# Patient Record
Sex: MALE | Race: WHITE | Employment: FULL TIME | URBAN - METROPOLITAN AREA
[De-identification: names, ages, dates, MRNs, and addresses within clinical notes are randomized per-mention and may not be internally consistent; named-entity substitution may affect disease eponyms.]

---

## 2021-09-28 ENCOUNTER — OFFICE VISIT (OUTPATIENT)
Dept: FAMILY MEDICINE CLINIC | Age: 36
End: 2021-09-28
Payer: COMMERCIAL

## 2021-09-28 VITALS
SYSTOLIC BLOOD PRESSURE: 128 MMHG | HEIGHT: 74 IN | OXYGEN SATURATION: 98 % | RESPIRATION RATE: 15 BRPM | DIASTOLIC BLOOD PRESSURE: 84 MMHG | HEART RATE: 85 BPM | TEMPERATURE: 98.2 F | BODY MASS INDEX: 30.03 KG/M2 | WEIGHT: 234 LBS

## 2021-09-28 DIAGNOSIS — N52.9 ERECTILE DYSFUNCTION, UNSPECIFIED ERECTILE DYSFUNCTION TYPE: Primary | ICD-10-CM

## 2021-09-28 DIAGNOSIS — R73.9 HYPERGLYCEMIA: ICD-10-CM

## 2021-09-28 PROCEDURE — 99203 OFFICE O/P NEW LOW 30 MIN: CPT | Performed by: INTERNAL MEDICINE

## 2021-09-28 RX ORDER — TADALAFIL 10 MG/1
10 TABLET ORAL DAILY PRN
Qty: 30 TABLET | Refills: 2 | Status: SHIPPED | OUTPATIENT
Start: 2021-09-28 | End: 2021-11-08 | Stop reason: SDUPTHER

## 2021-09-28 SDOH — ECONOMIC STABILITY: FOOD INSECURITY: WITHIN THE PAST 12 MONTHS, THE FOOD YOU BOUGHT JUST DIDN'T LAST AND YOU DIDN'T HAVE MONEY TO GET MORE.: NEVER TRUE

## 2021-09-28 SDOH — ECONOMIC STABILITY: FOOD INSECURITY: WITHIN THE PAST 12 MONTHS, YOU WORRIED THAT YOUR FOOD WOULD RUN OUT BEFORE YOU GOT MONEY TO BUY MORE.: NEVER TRUE

## 2021-09-28 ASSESSMENT — PATIENT HEALTH QUESTIONNAIRE - PHQ9
SUM OF ALL RESPONSES TO PHQ QUESTIONS 1-9: 0
SUM OF ALL RESPONSES TO PHQ9 QUESTIONS 1 & 2: 0
1. LITTLE INTEREST OR PLEASURE IN DOING THINGS: 0
SUM OF ALL RESPONSES TO PHQ QUESTIONS 1-9: 0
2. FEELING DOWN, DEPRESSED OR HOPELESS: 0
SUM OF ALL RESPONSES TO PHQ QUESTIONS 1-9: 0

## 2021-09-28 ASSESSMENT — ENCOUNTER SYMPTOMS
ABDOMINAL PAIN: 0
BACK PAIN: 0
EYE PAIN: 0
SHORTNESS OF BREATH: 0

## 2021-09-28 ASSESSMENT — SOCIAL DETERMINANTS OF HEALTH (SDOH): HOW HARD IS IT FOR YOU TO PAY FOR THE VERY BASICS LIKE FOOD, HOUSING, MEDICAL CARE, AND HEATING?: NOT HARD AT ALL

## 2021-09-28 NOTE — PROGRESS NOTES
Subjective:      Patient ID: Khanh Mccracken is a 29 y.o. male who presents today with:  Chief Complaint   Patient presents with    Establish Care     no recent PCP     Flu Vaccine     declined        HPI    History reviewed. No pertinent past medical history. History reviewed. No pertinent surgical history. Social History     Socioeconomic History    Marital status: Unknown     Spouse name: Not on file    Number of children: Not on file    Years of education: Not on file    Highest education level: Not on file   Occupational History    Not on file   Tobacco Use    Smoking status: Former Smoker     Packs/day: 0.25     Years: 5.00     Pack years: 1.25     Types: Cigarettes     Quit date: 9/28/2011     Years since quitting: 10.0    Smokeless tobacco: Former User     Quit date: 2011   Vaping Use    Vaping Use: Never assessed   Substance and Sexual Activity    Alcohol use: Yes     Comment: social     Drug use: Never    Sexual activity: Not on file   Other Topics Concern    Not on file   Social History Narrative    Not on file     Social Determinants of Health     Financial Resource Strain: Low Risk     Difficulty of Paying Living Expenses: Not hard at all   Food Insecurity: No Food Insecurity    Worried About [address] in the Last Year: Never true    [facility] in the Last Year: Never true   Transportation Needs:     Lack of Transportation (Medical):      Lack of Transportation (Non-Medical):    Physical Activity:     Days of Exercise per Week:     Minutes of Exercise per Session:    Stress:     Feeling of Stress :    Social Connections:     Frequency of Communication with Friends and Family:     Frequency of Social Gatherings with Friends and Family:     Attends Church Services:     Active Member of Clubs or Organizations:     Attends Club or Organization Meetings:     Marital Status:    Intimate Partner Violence:     Fear of Current or Ex-Partner:     Emotionally Abused:  Physically Abused:     Sexually Abused:      No Known Allergies  No current outpatient medications on file prior to visit. No current facility-administered medications on file prior to visit. I have personally reviewed the ROS, PMH, PFH, and social history     Review of Systems   Constitutional: Negative for chills. HENT: Negative for congestion. Eyes: Negative for pain. Respiratory: Negative for shortness of breath. Cardiovascular: Negative for chest pain. Gastrointestinal: Negative for abdominal pain. Genitourinary: Negative for hematuria. Musculoskeletal: Negative for back pain. Allergic/Immunologic: Negative for immunocompromised state. Neurological: Negative for headaches. Psychiatric/Behavioral: Negative for hallucinations. Objective:   /84 (Site: Right Upper Arm, Position: Sitting, Cuff Size: Large Adult)   Pulse 85   Temp 98.2 °F (36.8 °C) (Tympanic)   Resp 15   Ht 6' 2\" (1.88 m)   Wt 234 lb (106.1 kg)   SpO2 98%   BMI 30.04 kg/m²     Physical Exam  Constitutional:       General: He is not in acute distress. Appearance: Normal appearance. He is not ill-appearing, toxic-appearing or diaphoretic. HENT:      Head: Normocephalic. Neck:      Vascular: No carotid bruit. Cardiovascular:      Rate and Rhythm: Normal rate and regular rhythm. Pulses: Normal pulses. Heart sounds: Normal heart sounds. No murmur heard. No friction rub. No gallop. Pulmonary:      Effort: Pulmonary effort is normal. No respiratory distress. Breath sounds: Normal breath sounds. No wheezing, rhonchi or rales. Abdominal:      General: Abdomen is flat. There is no distension. Palpations: Abdomen is soft. Tenderness: There is no abdominal tenderness. There is no right CVA tenderness, left CVA tenderness, guarding or rebound. Musculoskeletal:      Cervical back: Neck supple. Right lower leg: No edema. Left lower leg: No edema.    Skin: General: Skin is warm. Findings: No erythema or rash. Neurological:      Mental Status: He is alert. Psychiatric:         Mood and Affect: Mood normal.           Assessment:       Diagnosis Orders   1. Erectile dysfunction, unspecified erectile dysfunction type  TSH with Reflex    Vitamin D 25 Hydroxy    CBC Auto Differential    Comprehensive Metabolic Panel    Lipid Panel    Testosterone Free And Total Male         Plan:     vc  Start cialis. See orders. Orders Placed This Encounter   Procedures    TSH with Reflex     Standing Status:   Future     Standing Expiration Date:   9/28/2022    Vitamin D 25 Hydroxy     Standing Status:   Future     Standing Expiration Date:   9/28/2022    CBC Auto Differential     Standing Status:   Future     Standing Expiration Date:   9/28/2022    Comprehensive Metabolic Panel     Standing Status:   Future     Standing Expiration Date:   9/28/2022    Lipid Panel     Standing Status:   Future     Standing Expiration Date:   9/28/2022     Order Specific Question:   Is Patient Fasting?/# of Hours     Answer:   10    Testosterone Free And Total Male     Standing Status:   Future     Standing Expiration Date:   9/28/2022     Orders Placed This Encounter   Medications    tadalafil (CIALIS) 10 MG tablet     Sig: Take 1 tablet by mouth daily as needed for Erectile Dysfunction Max 1 tablet per 24 hour period. Dispense:  30 tablet     Refill:  2   flu shot 2021 he will get at work. Given rx for pde inhibitor, knows not to take any new medications without me knowing as it can cause an unsafe drop in blood pressure. Any visual complaints call me as well. If anything should change or worsen call ASAP, don't wait for next scheduled appointment. No follow-ups on file.       Vickie Damon MD

## 2021-09-29 ENCOUNTER — TELEPHONE (OUTPATIENT)
Dept: FAMILY MEDICINE CLINIC | Age: 36
End: 2021-09-29

## 2021-11-08 ENCOUNTER — OFFICE VISIT (OUTPATIENT)
Dept: FAMILY MEDICINE CLINIC | Age: 36
End: 2021-11-08
Payer: COMMERCIAL

## 2021-11-08 VITALS
HEIGHT: 73 IN | WEIGHT: 237 LBS | TEMPERATURE: 98.2 F | SYSTOLIC BLOOD PRESSURE: 124 MMHG | HEART RATE: 100 BPM | DIASTOLIC BLOOD PRESSURE: 86 MMHG | OXYGEN SATURATION: 98 % | BODY MASS INDEX: 31.41 KG/M2

## 2021-11-08 DIAGNOSIS — R73.9 HYPERGLYCEMIA: ICD-10-CM

## 2021-11-08 DIAGNOSIS — N52.9 ERECTILE DYSFUNCTION, UNSPECIFIED ERECTILE DYSFUNCTION TYPE: ICD-10-CM

## 2021-11-08 DIAGNOSIS — R73.9 HYPERGLYCEMIA: Primary | ICD-10-CM

## 2021-11-08 DIAGNOSIS — E78.1 HYPERTRIGLYCERIDEMIA: ICD-10-CM

## 2021-11-08 LAB
ALBUMIN SERPL-MCNC: 4.8 G/DL (ref 3.5–4.6)
ALP BLD-CCNC: 102 U/L (ref 35–104)
ALT SERPL-CCNC: 36 U/L (ref 0–41)
ANION GAP SERPL CALCULATED.3IONS-SCNC: 17 MEQ/L (ref 9–15)
AST SERPL-CCNC: 26 U/L (ref 0–40)
BILIRUB SERPL-MCNC: 0.3 MG/DL (ref 0.2–0.7)
BUN BLDV-MCNC: 12 MG/DL (ref 6–20)
CALCIUM SERPL-MCNC: 10 MG/DL (ref 8.5–9.9)
CHLORIDE BLD-SCNC: 100 MEQ/L (ref 95–107)
CO2: 21 MEQ/L (ref 20–31)
CREAT SERPL-MCNC: 0.68 MG/DL (ref 0.7–1.2)
GFR AFRICAN AMERICAN: >60
GFR NON-AFRICAN AMERICAN: >60
GLOBULIN: 3.1 G/DL (ref 2.3–3.5)
GLUCOSE BLD-MCNC: 274 MG/DL (ref 70–99)
HBA1C MFR BLD: 10.6 % (ref 4.8–5.9)
LDL CHOLESTEROL DIRECT: 113 MG/DL (ref 0–129)
POTASSIUM SERPL-SCNC: 4.4 MEQ/L (ref 3.4–4.9)
SODIUM BLD-SCNC: 138 MEQ/L (ref 135–144)
TOTAL PROTEIN: 7.9 G/DL (ref 6.3–8)

## 2021-11-08 PROCEDURE — 99214 OFFICE O/P EST MOD 30 MIN: CPT | Performed by: INTERNAL MEDICINE

## 2021-11-08 RX ORDER — FENOFIBRATE 48 MG/1
48 TABLET, COATED ORAL DAILY
Qty: 30 TABLET | Refills: 0 | Status: SHIPPED | OUTPATIENT
Start: 2021-11-08 | End: 2021-12-07

## 2021-11-08 RX ORDER — TADALAFIL 10 MG/1
10 TABLET ORAL DAILY PRN
Qty: 30 TABLET | Refills: 5 | Status: SHIPPED | OUTPATIENT
Start: 2021-11-08 | End: 2022-01-19 | Stop reason: SDUPTHER

## 2021-11-08 RX ORDER — METFORMIN HYDROCHLORIDE 500 MG/1
500 TABLET, EXTENDED RELEASE ORAL EVERY 12 HOURS
Qty: 28 TABLET | Refills: 0 | Status: SHIPPED | OUTPATIENT
Start: 2021-11-08 | End: 2021-12-07

## 2021-11-08 RX ORDER — CLOTRIMAZOLE 1 %
CREAM (GRAM) TOPICAL
Qty: 30 G | Refills: 2 | Status: SHIPPED | OUTPATIENT
Start: 2021-11-08 | End: 2021-11-15

## 2021-11-08 ASSESSMENT — ENCOUNTER SYMPTOMS
EYE PAIN: 0
SHORTNESS OF BREATH: 0
BACK PAIN: 0
ABDOMINAL PAIN: 0

## 2021-11-08 NOTE — PROGRESS NOTES
file    Attends Jewish Services: Not on file    Active Member of Clubs or Organizations: Not on file    Attends Club or Organization Meetings: Not on file    Marital Status: Not on file   Intimate Partner Violence:     Fear of Current or Ex-Partner: Not on file    Emotionally Abused: Not on file    Physically Abused: Not on file    Sexually Abused: Not on file   Housing Stability:     Unable to Pay for Housing in the Last Year: Not on file    Number of [place] in the Last Year: Not on file    Unstable Housing in the Last Year: Not on file     Current Outpatient Medications on File Prior to Visit   Medication Sig Dispense Refill    vitamin D 25 MCG (1000 UT) CAPS Take by mouth daily       No current facility-administered medications on file prior to visit. No Known Allergies    Review of Systems   Constitutional: Negative for chills. HENT: Negative for congestion. Eyes: Negative for pain. Respiratory: Negative for shortness of breath. Cardiovascular: Negative for chest pain. Gastrointestinal: Negative for abdominal pain. Genitourinary: Negative for hematuria. Musculoskeletal: Negative for back pain. Allergic/Immunologic: Negative for immunocompromised state. Neurological: Negative for headaches. Psychiatric/Behavioral: Negative for hallucinations. Objective  Vitals:    11/08/21 1048   BP: 124/86   Pulse: 100   Temp: 98.2 °F (36.8 °C)   SpO2: 98%   Weight: 237 lb (107.5 kg)   Height: 6' 1\" (1.854 m)     Physical Exam  Constitutional:       General: He is not in acute distress. Appearance: Normal appearance. He is not ill-appearing, toxic-appearing or diaphoretic. HENT:      Head: Normocephalic. Neck:      Vascular: No carotid bruit. Cardiovascular:      Rate and Rhythm: Normal rate and regular rhythm. Pulses: Normal pulses. Heart sounds: Normal heart sounds. No murmur heard. No friction rub. No gallop.     Pulmonary:      Effort: Pulmonary effort Standing Expiration Date:   11/8/2022     Orders Placed This Encounter   Medications    fenofibrate (TRICOR) 48 MG tablet     Sig: Take 1 tablet by mouth daily     Dispense:  30 tablet     Refill:  0    tadalafil (CIALIS) 10 MG tablet     Sig: Take 1 tablet by mouth daily as needed for Erectile Dysfunction Max 1 tablet per 24 hour period. Dispense:  30 tablet     Refill:  5       No follow-ups on file. Jackie Stephens MD    If anything should change or worsen call ASAP, don't wait for next scheduled appointment.

## 2021-11-11 LAB
SEX HORMONE BINDING GLOBULIN: 22 NMOL/L (ref 11–80)
TESTOSTERONE FREE PERCENT: 2.2 % (ref 1.6–2.9)
TESTOSTERONE FREE, CALC: 77 PG/ML (ref 47–244)
TESTOSTERONE TOTAL-MALE: 348 NG/DL (ref 300–1080)

## 2021-12-07 ENCOUNTER — VIRTUAL VISIT (OUTPATIENT)
Dept: FAMILY MEDICINE CLINIC | Age: 36
End: 2021-12-07
Payer: COMMERCIAL

## 2021-12-07 DIAGNOSIS — E78.5 HYPERLIPIDEMIA, UNSPECIFIED HYPERLIPIDEMIA TYPE: ICD-10-CM

## 2021-12-07 DIAGNOSIS — E78.1 HYPERTRIGLYCERIDEMIA: ICD-10-CM

## 2021-12-07 DIAGNOSIS — E11.65 UNCONTROLLED TYPE 2 DIABETES MELLITUS WITH HYPERGLYCEMIA (HCC): Primary | ICD-10-CM

## 2021-12-07 PROCEDURE — 99443 PR PHYS/QHP TELEPHONE EVALUATION 21-30 MIN: CPT | Performed by: INTERNAL MEDICINE

## 2021-12-07 RX ORDER — DULAGLUTIDE 0.75 MG/.5ML
0.75 INJECTION, SOLUTION SUBCUTANEOUS WEEKLY
Qty: 4 PEN | Refills: 0 | Status: SHIPPED | OUTPATIENT
Start: 2021-12-07 | End: 2022-02-09 | Stop reason: CLARIF

## 2021-12-07 RX ORDER — FENOFIBRATE 145 MG/1
145 TABLET, COATED ORAL DAILY
Qty: 90 TABLET | Refills: 0 | Status: SHIPPED | OUTPATIENT
Start: 2021-12-07 | End: 2022-02-09 | Stop reason: SDUPTHER

## 2021-12-07 RX ORDER — METFORMIN HYDROCHLORIDE 500 MG/1
TABLET, EXTENDED RELEASE ORAL
Qty: 360 TABLET | Refills: 0 | Status: SHIPPED | OUTPATIENT
Start: 2021-12-07 | End: 2022-01-19 | Stop reason: SDUPTHER

## 2021-12-07 RX ORDER — ATORVASTATIN CALCIUM 20 MG/1
20 TABLET, FILM COATED ORAL DAILY
Qty: 90 TABLET | Refills: 0 | Status: SHIPPED | OUTPATIENT
Start: 2021-12-07 | End: 2022-02-09 | Stop reason: SDUPTHER

## 2021-12-07 ASSESSMENT — ENCOUNTER SYMPTOMS
ABDOMINAL PAIN: 0
SHORTNESS OF BREATH: 0
BACK PAIN: 0
EYE PAIN: 0

## 2021-12-07 NOTE — PROGRESS NOTES
Subjective:      Patient ID: Joselyn Padilla is a 29 y.o. male who presents today with:  No chief complaint on file. HPI     Here for follow up     No past medical history on file. No past surgical history on file. Social History     Socioeconomic History    Marital status: Unknown     Spouse name: Not on file    Number of children: Not on file    Years of education: Not on file    Highest education level: Not on file   Occupational History    Not on file   Tobacco Use    Smoking status: Former Smoker     Packs/day: 0.25     Years: 5.00     Pack years: 1.25     Types: Cigarettes     Quit date: 9/28/2011     Years since quitting: 10.2    Smokeless tobacco: Former User     Quit date: 2011   Vaping Use    Vaping Use: Not on file   Substance and Sexual Activity    Alcohol use: Yes     Comment: social     Drug use: Never    Sexual activity: Not on file   Other Topics Concern    Not on file   Social History Narrative    Not on file     Social Determinants of Health     Financial Resource Strain: Low Risk     Difficulty of Paying Living Expenses: Not hard at all   Food Insecurity: No Food Insecurity    Worried About [address] in the Last Year: Never true    [facility] in the Last Year: Never true   Transportation Needs:     Lack of Transportation (Medical): Not on file    Lack of Transportation (Non-Medical):  Not on file   Physical Activity:     Days of Exercise per Week: Not on file    Minutes of Exercise per Session: Not on file   Stress:     Feeling of Stress : Not on file   Social Connections:     Frequency of Communication with Friends and Family: Not on file    Frequency of Social Gatherings with Friends and Family: Not on file    Attends Latter day Services: Not on file    Active Member of Clubs or Organizations: Not on file    Attends Club or Organization Meetings: Not on file    Marital Status: Not on file   Intimate Partner Violence:     Fear of Current or Ex-Partner: Not on file    Emotionally Abused: Not on file    Physically Abused: Not on file    Sexually Abused: Not on file   Housing Stability:     Unable to Pay for Housing in the Last Year: Not on file    Number of [place] in the Last Year: Not on file    Unstable Housing in the Last Year: Not on file     No Known Allergies  Current Outpatient Medications on File Prior to Visit   Medication Sig Dispense Refill    vitamin D 25 MCG (1000 UT) CAPS Take by mouth daily      tadalafil (CIALIS) 10 MG tablet Take 1 tablet by mouth daily as needed for Erectile Dysfunction Max 1 tablet per 24 hour period. 30 tablet 5     No current facility-administered medications on file prior to visit. I have personally reviewed the ROS, PMH, PFH, and social history     Review of Systems   Constitutional: Negative for chills. HENT: Negative for congestion. Eyes: Negative for pain. Respiratory: Negative for shortness of breath. Cardiovascular: Negative for chest pain. Gastrointestinal: Negative for abdominal pain. Genitourinary: Negative for hematuria. Musculoskeletal: Negative for back pain. Allergic/Immunologic: Negative for immunocompromised state. Neurological: Negative for headaches. Psychiatric/Behavioral: Negative for hallucinations. Objective: There were no vitals taken for this visit. Physical Exam  Constitutional:       General: He is not in acute distress. Appearance: He is not ill-appearing, toxic-appearing or diaphoretic. Assessment:       Diagnosis Orders   1. Uncontrolled type 2 diabetes mellitus with hyperglycemia (HCC)  metFORMIN (GLUCOPHAGE-XR) 500 MG extended release tablet    Dulaglutide (TRULICITY) 6.50 HW/8.7FS SOPN    blood glucose monitor kit and supplies    NICKIE - Letcher Runner, OD, Optometry, [facility] / Creatinine Urine Ratio   2.  Hypertriglyceridemia  fenofibrate (TRICOR) 145 MG tablet    blood glucose monitor kit and supplies   3. Hyperlipidemia, unspecified hyperlipidemia type  atorvastatin (LIPITOR) 20 MG tablet    blood glucose monitor kit and supplies         Plan:    Telephone visit done because of coronavirus pandemic. Time spent 21 Minutes   explained billeable visit, patient understands and agrees to continue  I was at home and patient was at home during this visit. A1c/lipid 02/2022  Metformin, titrate up, add trulicity as well   Add Statin  Increase tricor  Please get bp check this week. He will contemplate covid booster   See opto                Orders Placed This Encounter   Procedures    Microalbumin / Creatinine Urine Ratio     Standing Status:   Future     Standing Expiration Date:   12/7/2022    NICKIE - Patel Lomas, Optometry, [facility]     Referral Priority:   Routine     Referral Type:   Eval and Treat     Referral Reason:   Specialty Services Required     Referred to Provider:   Zee Earl     Requested Specialty:   Optometry     Number of Visits Requested:   1     Orders Placed This Encounter   Medications    atorvastatin (LIPITOR) 20 MG tablet     Sig: Take 1 tablet by mouth daily     Dispense:  90 tablet     Refill:  0    metFORMIN (GLUCOPHAGE-XR) 500 MG extended release tablet     Sig: Take one tablet po once daily x 3 days, then on day 4 take one tablet po bid x 4 days, then on day 8 take 2 tablets po once daily in the morning and 1 tablet po once daily at night x 5 days, on day 13, take 2 tablets po every 12 hours. Dispense:  360 tablet     Refill:  0    Dulaglutide (TRULICITY) 3.71 YY/6.7OY SOPN     Sig: Inject 0.75 mg into the skin once a week     Dispense:  4 pen     Refill:  0    fenofibrate (TRICOR) 145 MG tablet     Sig: Take 1 tablet by mouth daily     Dispense:  90 tablet     Refill:  0    blood glucose monitor kit and supplies     Sig: Dispense sufficient amount for indicated testing frequency plus additional to accommodate PRN testing needs.  Dispense all needed supplies to include: monitor, strips, lancing device, lancets, control solutions, alcohol swabs. Patient tests once daily     Dispense:  1 kit     Refill:  0     Brand per patient preference. May round up to next available package size. apparently he never got the metformin prescription   No fh or personal history of thyroid cancer   FLU SHOT 2021 DONE AT WORK. S/s of HYPO AND Hyperglycemia explained  He knows to call asap should that occur   Understands if not working to call asap and would once daily insulin. If fasting bs are over 350 contact me. If anything should change or worsen call ASAP, don't wait for next scheduled appointment. Return in about 6 weeks (around 1/18/2022) for Chronic condition management/appointment, worsening symptoms, call ASAP for appointment.       Oleksandr Ravi MD

## 2021-12-09 ENCOUNTER — PATIENT MESSAGE (OUTPATIENT)
Dept: FAMILY MEDICINE CLINIC | Age: 36
End: 2021-12-09

## 2021-12-09 NOTE — TELEPHONE ENCOUNTER
PA was denied since he has not tried any other medications aside from metformin. Is there another one we can try an authorize?

## 2022-01-17 ENCOUNTER — PATIENT MESSAGE (OUTPATIENT)
Dept: FAMILY MEDICINE CLINIC | Age: 37
End: 2022-01-17

## 2022-01-17 DIAGNOSIS — N52.9 ERECTILE DYSFUNCTION, UNSPECIFIED ERECTILE DYSFUNCTION TYPE: ICD-10-CM

## 2022-01-17 DIAGNOSIS — E11.65 UNCONTROLLED TYPE 2 DIABETES MELLITUS WITH HYPERGLYCEMIA (HCC): ICD-10-CM

## 2022-01-19 RX ORDER — TADALAFIL 10 MG/1
10 TABLET ORAL DAILY PRN
Qty: 30 TABLET | Refills: 5 | Status: SHIPPED | OUTPATIENT
Start: 2022-01-19 | End: 2022-05-09 | Stop reason: SDUPTHER

## 2022-01-19 RX ORDER — METFORMIN HYDROCHLORIDE 500 MG/1
1000 TABLET, EXTENDED RELEASE ORAL 2 TIMES DAILY WITH MEALS
Qty: 360 TABLET | Refills: 3 | Status: SHIPPED | OUTPATIENT
Start: 2022-01-19 | End: 2022-02-09 | Stop reason: SDUPTHER

## 2022-01-19 NOTE — TELEPHONE ENCOUNTER
From: Rose Common  To: Dr. Aguilar Belt: 1/17/2022 3:04 PM EST  Subject: Prescription refills    I have attempted to refill my prescriptions for tadalafil and metformin and my pharmacy states I do not have any more refills left, this must be a mistake per our last appointment, can I get more refills added to my prescriptions?  My pharmacy is [facility] in [place]

## 2022-01-20 NOTE — TELEPHONE ENCOUNTER
He was given 360 tablets at the last appt he should still have some  Also they should request refills when he is out

## 2022-02-09 ENCOUNTER — OFFICE VISIT (OUTPATIENT)
Dept: FAMILY MEDICINE CLINIC | Age: 37
End: 2022-02-09
Payer: COMMERCIAL

## 2022-02-09 ENCOUNTER — TELEPHONE (OUTPATIENT)
Dept: FAMILY MEDICINE CLINIC | Age: 37
End: 2022-02-09

## 2022-02-09 VITALS
TEMPERATURE: 97.7 F | RESPIRATION RATE: 15 BRPM | HEART RATE: 84 BPM | DIASTOLIC BLOOD PRESSURE: 81 MMHG | BODY MASS INDEX: 32.23 KG/M2 | WEIGHT: 238 LBS | OXYGEN SATURATION: 98 % | HEIGHT: 72 IN | SYSTOLIC BLOOD PRESSURE: 125 MMHG

## 2022-02-09 DIAGNOSIS — E11.65 UNCONTROLLED TYPE 2 DIABETES MELLITUS WITH HYPERGLYCEMIA (HCC): Primary | ICD-10-CM

## 2022-02-09 DIAGNOSIS — Z11.4 SCREENING FOR HIV (HUMAN IMMUNODEFICIENCY VIRUS): ICD-10-CM

## 2022-02-09 DIAGNOSIS — E78.1 HYPERTRIGLYCERIDEMIA: ICD-10-CM

## 2022-02-09 DIAGNOSIS — Z23 ENCOUNTER FOR IMMUNIZATION: ICD-10-CM

## 2022-02-09 DIAGNOSIS — E78.5 HYPERLIPIDEMIA, UNSPECIFIED HYPERLIPIDEMIA TYPE: ICD-10-CM

## 2022-02-09 DIAGNOSIS — Z11.59 ENCOUNTER FOR HEPATITIS C SCREENING TEST FOR LOW RISK PATIENT: ICD-10-CM

## 2022-02-09 DIAGNOSIS — E11.65 UNCONTROLLED TYPE 2 DIABETES MELLITUS WITH HYPERGLYCEMIA (HCC): ICD-10-CM

## 2022-02-09 LAB
ALBUMIN SERPL-MCNC: 4.9 G/DL (ref 3.5–4.6)
ALP BLD-CCNC: 77 U/L (ref 35–104)
ALT SERPL-CCNC: 43 U/L (ref 0–41)
ANION GAP SERPL CALCULATED.3IONS-SCNC: 14 MEQ/L (ref 9–15)
AST SERPL-CCNC: 28 U/L (ref 0–40)
BILIRUB SERPL-MCNC: 0.4 MG/DL (ref 0.2–0.7)
BUN BLDV-MCNC: 11 MG/DL (ref 6–20)
CALCIUM SERPL-MCNC: 10.2 MG/DL (ref 8.5–9.9)
CHLORIDE BLD-SCNC: 96 MEQ/L (ref 95–107)
CHOLESTEROL, FASTING: 120 MG/DL (ref 0–199)
CO2: 26 MEQ/L (ref 20–31)
CREAT SERPL-MCNC: 0.87 MG/DL (ref 0.7–1.2)
GFR AFRICAN AMERICAN: >60
GFR NON-AFRICAN AMERICAN: >60
GLOBULIN: 3 G/DL (ref 2.3–3.5)
GLUCOSE BLD-MCNC: 170 MG/DL (ref 70–99)
HBA1C MFR BLD: 8.9 % (ref 4.8–5.9)
HDLC SERPL-MCNC: 54 MG/DL (ref 40–59)
LDL CHOLESTEROL CALCULATED: 33 MG/DL (ref 0–129)
POTASSIUM SERPL-SCNC: 4.2 MEQ/L (ref 3.4–4.9)
SODIUM BLD-SCNC: 136 MEQ/L (ref 135–144)
TOTAL PROTEIN: 7.9 G/DL (ref 6.3–8)
TRIGLYCERIDE, FASTING: 165 MG/DL (ref 0–150)

## 2022-02-09 PROCEDURE — 99214 OFFICE O/P EST MOD 30 MIN: CPT | Performed by: INTERNAL MEDICINE

## 2022-02-09 PROCEDURE — 90471 IMMUNIZATION ADMIN: CPT | Performed by: INTERNAL MEDICINE

## 2022-02-09 PROCEDURE — 90715 TDAP VACCINE 7 YRS/> IM: CPT | Performed by: INTERNAL MEDICINE

## 2022-02-09 RX ORDER — GLIPIZIDE 5 MG/1
5 TABLET, FILM COATED, EXTENDED RELEASE ORAL DAILY
Qty: 30 TABLET | Refills: 0 | Status: SHIPPED | OUTPATIENT
Start: 2022-02-09 | End: 2022-05-09 | Stop reason: CLARIF

## 2022-02-09 RX ORDER — ISOPROPYL ALCOHOL 0.75 G/1
SWAB TOPICAL
COMMUNITY
Start: 2021-12-07 | End: 2022-05-09 | Stop reason: SDUPTHER

## 2022-02-09 RX ORDER — LANCETS 33 GAUGE
EACH MISCELLANEOUS
COMMUNITY
Start: 2021-12-07 | End: 2022-05-09 | Stop reason: SDUPTHER

## 2022-02-09 RX ORDER — METFORMIN HYDROCHLORIDE 500 MG/1
1000 TABLET, EXTENDED RELEASE ORAL 2 TIMES DAILY WITH MEALS
Qty: 360 TABLET | Refills: 3 | Status: SHIPPED | OUTPATIENT
Start: 2022-02-09 | End: 2022-05-09 | Stop reason: SDUPTHER

## 2022-02-09 RX ORDER — ATORVASTATIN CALCIUM 20 MG/1
20 TABLET, FILM COATED ORAL DAILY
Qty: 90 TABLET | Refills: 1 | Status: SHIPPED | OUTPATIENT
Start: 2022-02-09 | End: 2022-05-09 | Stop reason: SDUPTHER

## 2022-02-09 RX ORDER — FENOFIBRATE 145 MG/1
145 TABLET, COATED ORAL DAILY
Qty: 90 TABLET | Refills: 3 | Status: SHIPPED | OUTPATIENT
Start: 2022-02-09 | End: 2022-05-09 | Stop reason: SDUPTHER

## 2022-02-09 RX ORDER — CALCIUM CITRATE/VITAMIN D3 200MG-6.25
TABLET ORAL
COMMUNITY
Start: 2021-12-28 | End: 2022-05-09 | Stop reason: SDUPTHER

## 2022-02-09 ASSESSMENT — ENCOUNTER SYMPTOMS
BACK PAIN: 0
EYE PAIN: 0
ABDOMINAL PAIN: 0
SHORTNESS OF BREATH: 0

## 2022-02-09 NOTE — PATIENT INSTRUCTIONS
Patient Education        glipizide  Pronunciation:  GLIP aydee kaur  Brand:  Glucotrol  What is the most important information I should know about glipizide? You should not use glipizide if you have diabetic ketoacidosis (call your doctor for treatment). What is glipizide? Glipizide is used together with diet and exercise to improve blood sugar control in adults with type 2 diabetes mellitus. Glipizide is not for treating type 1 diabetes. Glipizide may also be used for purposes not listed in this medication guide. What should I discuss with my healthcare provider before taking glipizide? You should not use this medicine if you are allergic to glipizide, or if you have diabetic ketoacidosis (call your doctor for treatment). Tell your doctor if you have ever had:  · liver or kidney disease;  · chronic diarrhea, or a blockage in your intestines; or  · an enzyme deficiency called glucose-6-phosphate dehydrogenase deficiency (G6PD). Follow your doctor's instructions about using this medicine if you are pregnant or you become pregnant. Controlling diabetes is very important during pregnancy, and having high blood sugar may cause complications in both the mother and the baby. However, you may need to stop taking glipizide for a short time just before your due date. It may not be safe to breastfeed while using this medicine. Ask your doctor about any risk. How should I take glipizide? Follow all directions on your prescription label and read all medication guides or instruction sheets. Your doctor may occasionally change your dose. Use the medicine exactly as directed. Take the glipizide regular tablet 30 minutes before your first meal of the day. Take the glipizide extended-release tablet with your first meal of the day. Swallow the tablet whole and do not crush, chew, or break it. Your blood sugar will need to be checked often, and you may need other blood tests at your doctor's office.   You may have low blood sugar (hypoglycemia) and feel very hungry, dizzy, irritable, confused, anxious, or shaky. To quickly treat hypoglycemia, eat or drink a fast-acting source of sugar (fruit juice, hard candy, crackers, raisins, or non-diet soda). Your doctor may prescribe a glucagon injection kit in case you have severe hypoglycemia. Be sure your family or close friends know how to give you this injection in an emergency. Also watch for signs of high blood sugar (hyperglycemia) such as increased thirst or urination. Blood sugar levels can be affected by stress, illness, surgery, exercise, alcohol use, or skipping meals. Ask your doctor before changing your dose or medication schedule. Some forms of glipizide are made with a shell that is not absorbed or melted in the body. Part of the tablet shell may appear in your stool. This is a normal side effect and will not make the medication less effective. Store at room temperature away from moisture, heat, and light. What happens if I miss a dose? Take your dose as soon as you can, but only if you are getting ready to eat a meal.  If you skip a meal, skip the missed dose and wait until your next meal. Do not take two doses at one time. Get your prescription refilled before you run out of medicine completely. What happens if I overdose? Seek emergency medical attention or call the Poison Help line at 1-974.679.5869. A glipizide overdose can cause life-threatening hypoglycemia. Symptoms of severe hypoglycemia include extreme weakness, blurred vision, sweating, trouble speaking, tremors, stomach pain, confusion, and seizure (convulsions). What should I avoid while taking glipizide? Avoid drinking alcohol. It lowers blood sugar and can cause side effects. Avoid driving or operating machinery until you know how this medicine will affect you. What are the possible side effects of glipizide?   Get emergency medical help if you have signs of an allergic reaction: hives; difficulty breathing; swelling of your face, lips, tongue, or throat. Call your doctor at once if you have symptoms of low blood sugar:  · headache, irritability  · sweating, fast heart rate;  · dizziness, nausea; or  · hunger, feeling anxious or shaky. Common side effects may include:  · diarrhea, constipation, gas;  · dizziness, drowsiness;  · tremors; or  · skin rash, redness, or itching. This is not a complete list of side effects and others may occur. Call your doctor for medical advice about side effects. You may report side effects to FDA at 1-076-FDA-8564. What other drugs will affect glipizide? Sometimes it is not safe to use certain medications at the same time. Some drugs can affect your blood levels of other drugs you take, which may increase side effects or make the medications less effective. Many drugs can affect glipizide. This includes prescription and over-the-counter medicines, vitamins, and herbal products. Not all possible interactions are listed here. Tell your doctor about all your current medicines and any medicine you start or stop using. Where can I get more information? Your pharmacist can provide more information about glipizide. Remember, keep this and all other medicines out of the reach of children, never share your medicines with others, and use this medication only for the indication prescribed. Every effort has been made to ensure that the information provided by [address] is accurate, up-to-date, and complete, but no guarantee is made to that effect. Drug information contained herein may be time sensitive. [facility] information has been compiled for use by healthcare practitioners and consumers in the [place] and therefore [facility] does not warrant that uses outside of the [place] are appropriate, unless specifically indicated otherwise. [facility] drug information does not endorse drugs, diagnose patients or recommend therapy.  [facility] drug information is an informational resource designed to assist licensed healthcare practitioners in caring for their patients and/or to serve consumers viewing this service as a supplement to, and not a substitute for, the expertise, skill, knowledge and judgment of healthcare practitioners. The absence of a warning for a given drug or drug combination in no way should be construed to indicate that the drug or drug combination is safe, effective or appropriate for any given patient. [facility] does not assume any responsibility for any aspect of healthcare administered with the aid of information [facility] provides. The information contained herein is not intended to cover all possible uses, directions, precautions, warnings, drug interactions, allergic reactions, or adverse effects. If you have questions about the drugs you are taking, check with your doctor, nurse or pharmacist.  Copyright 8110-7003 [address] [place] [address] Dr CARO. Version: 11.02. Revision date: 3/31/2020. Care instructions adapted under license by [facility] ([facility]). If you have questions about a medical condition or this instruction, always ask your healthcare professional. [place] [address] any warranty or liability for your use of this information.

## 2022-02-09 NOTE — PROGRESS NOTES
Subjective:      Patient ID: Dulce Maria Fisher is a 29 y.o. male who presents today with:  Chief Complaint   Patient presents with    3 Month Follow-Up    Discuss Labs       HPI      Here for follow up  Uncontrolled type 2 diabetes  But MUCH better  Never got trulicity   No past medical history on file. No past surgical history on file. Social History     Socioeconomic History    Marital status: Unknown     Spouse name: Not on file    Number of children: Not on file    Years of education: Not on file    Highest education level: Not on file   Occupational History    Not on file   Tobacco Use    Smoking status: Former Smoker     Packs/day: 0.25     Years: 5.00     Pack years: 1.25     Types: Cigarettes     Quit date: 9/28/2011     Years since quitting: 10.3    Smokeless tobacco: Former User     Quit date: 2011   Vaping Use    Vaping Use: Not on file   Substance and Sexual Activity    Alcohol use: Yes     Comment: social     Drug use: Never    Sexual activity: Not on file   Other Topics Concern    Not on file   Social History Narrative    Not on file     Social Determinants of Health     Financial Resource Strain: Low Risk     Difficulty of Paying Living Expenses: Not hard at all   Food Insecurity: No Food Insecurity    Worried About [address] [facility] in the Last Year: Never true    [address] in the Last Year: Never true   Transportation Needs:     Lack of Transportation (Medical): Not on file    Lack of Transportation (Non-Medical):  Not on file   Physical Activity:     Days of Exercise per Week: Not on file    Minutes of Exercise per Session: Not on file   Stress:     Feeling of Stress : Not on file   Social Connections:     Frequency of Communication with Friends and Family: Not on file    Frequency of Social Gatherings with Friends and Family: Not on file    Attends Orthodox Services: Not on file    Active Member of Clubs or Organizations: Not on file    Attends Club or Organization Meetings: Not on file    Marital Status: Not on file   Intimate Partner Violence:     Fear of Current or Ex-Partner: Not on file    Emotionally Abused: Not on file    Physically Abused: Not on file    Sexually Abused: Not on file   Housing Stability:     Unable to Pay for Housing in the Last Year: Not on file    Number of [place] in the Last Year: Not on file    Unstable Housing in the Last Year: Not on file     No Known Allergies  Current Outpatient Medications on File Prior to Visit   Medication Sig Dispense Refill    Alcohol Swabs (B-D SINGLE USE SWABS REGULAR) PADS test once daily.  TRUE METRIX BLOOD GLUCOSE TEST strip test once daily.  Drug [place] Unilet Lancets 33G MISC test once daily.  tadalafil (CIALIS) 10 MG tablet Take 1 tablet by mouth daily as needed for Erectile Dysfunction Max 1 tablet per 24 hour period. 30 tablet 5    blood glucose monitor kit and supplies Dispense sufficient amount for indicated testing frequency plus additional to accommodate PRN testing needs. Dispense all needed supplies to include: monitor, strips, lancing device, lancets, control solutions, alcohol swabs. Patient tests once daily 1 kit 0    vitamin D 25 MCG (1000 UT) CAPS Take by mouth daily       No current facility-administered medications on file prior to visit. I have personally reviewed the ROS, PMH, PFH, and social history     Review of Systems   Constitutional: Negative for chills. HENT: Negative for congestion. Eyes: Negative for pain. Respiratory: Negative for shortness of breath. Cardiovascular: Negative for chest pain. Gastrointestinal: Negative for abdominal pain. Genitourinary: Negative for hematuria. Musculoskeletal: Negative for back pain. Allergic/Immunologic: Negative for immunocompromised state. Neurological: Negative for headaches. Psychiatric/Behavioral: Negative for hallucinations.        Objective:   /81 (Site: Right Upper Arm, Position: Sitting, Cuff Size: Large Adult)   Pulse 84   Temp 97.7 °F (36.5 °C) (Temporal)   Resp 15   Ht 6' (1.829 m)   Wt 238 lb (108 kg)   SpO2 98%   BMI 32.28 kg/m²     Physical Exam  Constitutional:       General: He is not in acute distress. Appearance: Normal appearance. He is not ill-appearing, toxic-appearing or diaphoretic. HENT:      Head: Normocephalic. Neck:      Vascular: No carotid bruit. Cardiovascular:      Rate and Rhythm: Normal rate and regular rhythm. Pulses: Normal pulses. Heart sounds: Normal heart sounds. No murmur heard. No friction rub. No gallop. Pulmonary:      Effort: Pulmonary effort is normal. No respiratory distress. Breath sounds: Normal breath sounds. No wheezing, rhonchi or rales. Abdominal:      General: Abdomen is flat. There is no distension. Palpations: Abdomen is soft. Tenderness: There is no abdominal tenderness. There is no right CVA tenderness, left CVA tenderness, guarding or rebound. Musculoskeletal:      Cervical back: Neck supple. Right lower leg: No edema. Left lower leg: No edema. Skin:     General: Skin is warm. Findings: No erythema or rash. Neurological:      Mental Status: He is alert. Psychiatric:         Mood and Affect: Mood normal.       Feet without ulcers. Assessment:       Diagnosis Orders   1. Uncontrolled type 2 diabetes mellitus with hyperglycemia (HCC)  metFORMIN (GLUCOPHAGE-XR) 500 MG extended release tablet    Hemoglobin A1C    Lipid, Fasting    Comprehensive Metabolic Panel    Microalbumin / Creatinine Urine Ratio   2. Hyperlipidemia, unspecified hyperlipidemia type  atorvastatin (LIPITOR) 20 MG tablet    Hemoglobin A1C    Lipid, Fasting    Comprehensive Metabolic Panel    Microalbumin / Creatinine Urine Ratio   3.  Hypertriglyceridemia  fenofibrate (TRICOR) 145 MG tablet    Hemoglobin A1C    Lipid, Fasting    Comprehensive Metabolic Panel    Microalbumin / Creatinine Urine Ratio   4. Encounter for immunization  Tdap (age 10y-63y) IM (ADACEL)   11. Screening for HIV (human immunodeficiency virus)  HIV Screen   6.  Encounter for hepatitis C screening test for low risk patient  Hepatitis C Antibody         Plan:     vc  Add glipizide, titrate up (plan for ozempic or trulicity once he gets insurance in 04/2022)   Please schedule opto  May increase atorvastatin                       Orders Placed This Encounter   Procedures    Tdap (age 10y-63y) IM (ADACEL)    Hemoglobin A1C     Standing Status:   Future     Number of Occurrences:   1     Standing Expiration Date:   2/9/2023    Lipid, Fasting     Standing Status:   Future     Number of Occurrences:   1     Standing Expiration Date:   2/9/2023    Comprehensive Metabolic Panel     Standing Status:   Future     Number of Occurrences:   1     Standing Expiration Date:   2/9/2023    Microalbumin / Creatinine Urine Ratio     Standing Status:   Future     Standing Expiration Date:   2/9/2023    HIV Screen     Standing Status:   Future     Number of Occurrences:   1     Standing Expiration Date:   2/9/2023    Hepatitis C Antibody     Standing Status:   Future     Number of Occurrences:   1     Standing Expiration Date:   2/9/2023     Orders Placed This Encounter   Medications    metFORMIN (GLUCOPHAGE-XR) 500 MG extended release tablet     Sig: Take 2 tablets by mouth 2 times daily (with meals)     Dispense:  360 tablet     Refill:  3    atorvastatin (LIPITOR) 20 MG tablet     Sig: Take 1 tablet by mouth daily     Dispense:  90 tablet     Refill:  1    fenofibrate (TRICOR) 145 MG tablet     Sig: Take 1 tablet by mouth daily     Dispense:  90 tablet     Refill:  3    glipiZIDE (GLUCOTROL XL) 5 MG extended release tablet     Sig: Take 1 tablet by mouth daily     Dispense:  30 tablet     Refill:  0   GETTING new insurance 04/2022  Contact us then to start trulicity or ozempic   If fasting sugar under 100 contact me   If anything should change or worsen call ASAP, don't wait for next scheduled appointment. Return in about 3 months (around 5/9/2022) for Chronic condition management/appointment, worsening symptoms, call ASAP for appointment.       Travis Barclay MD

## 2022-02-10 LAB
HEPATITIS C ANTIBODY: NONREACTIVE
HIV AG/AB: NONREACTIVE

## 2022-03-29 DIAGNOSIS — E78.1 HYPERTRIGLYCERIDEMIA: ICD-10-CM

## 2022-03-29 DIAGNOSIS — E11.65 UNCONTROLLED TYPE 2 DIABETES MELLITUS WITH HYPERGLYCEMIA (HCC): ICD-10-CM

## 2022-03-29 DIAGNOSIS — E78.5 HYPERLIPIDEMIA, UNSPECIFIED HYPERLIPIDEMIA TYPE: ICD-10-CM

## 2022-03-29 LAB
CREATININE URINE: 69.8 MG/DL
MICROALBUMIN UR-MCNC: <1.2 MG/DL
MICROALBUMIN/CREAT UR-RTO: NORMAL MG/G (ref 0–30)

## 2022-05-09 ENCOUNTER — OFFICE VISIT (OUTPATIENT)
Dept: FAMILY MEDICINE CLINIC | Age: 37
End: 2022-05-09
Payer: COMMERCIAL

## 2022-05-09 VITALS
WEIGHT: 260 LBS | SYSTOLIC BLOOD PRESSURE: 130 MMHG | BODY MASS INDEX: 35.26 KG/M2 | HEART RATE: 90 BPM | OXYGEN SATURATION: 96 % | DIASTOLIC BLOOD PRESSURE: 74 MMHG | TEMPERATURE: 97.3 F

## 2022-05-09 DIAGNOSIS — R74.01 HIGH ALANINE AMINOTRANSFERASE (ALT) LEVEL: ICD-10-CM

## 2022-05-09 DIAGNOSIS — E78.5 HYPERLIPIDEMIA, UNSPECIFIED HYPERLIPIDEMIA TYPE: ICD-10-CM

## 2022-05-09 DIAGNOSIS — E78.1 HYPERTRIGLYCERIDEMIA: ICD-10-CM

## 2022-05-09 DIAGNOSIS — N52.9 ERECTILE DYSFUNCTION, UNSPECIFIED ERECTILE DYSFUNCTION TYPE: ICD-10-CM

## 2022-05-09 DIAGNOSIS — E11.65 UNCONTROLLED TYPE 2 DIABETES MELLITUS WITH HYPERGLYCEMIA (HCC): Primary | ICD-10-CM

## 2022-05-09 DIAGNOSIS — E11.65 UNCONTROLLED TYPE 2 DIABETES MELLITUS WITH HYPERGLYCEMIA (HCC): ICD-10-CM

## 2022-05-09 LAB
ALBUMIN SERPL-MCNC: 4.4 G/DL (ref 3.5–4.6)
ALP BLD-CCNC: 78 U/L (ref 35–104)
ALT SERPL-CCNC: 48 U/L (ref 0–41)
ANION GAP SERPL CALCULATED.3IONS-SCNC: 14 MEQ/L (ref 9–15)
AST SERPL-CCNC: 30 U/L (ref 0–40)
BASOPHILS ABSOLUTE: 0 K/UL (ref 0–0.2)
BASOPHILS RELATIVE PERCENT: 0.6 %
BILIRUB SERPL-MCNC: 0.4 MG/DL (ref 0.2–0.7)
BUN BLDV-MCNC: 10 MG/DL (ref 6–20)
CALCIUM SERPL-MCNC: 9.5 MG/DL (ref 8.5–9.9)
CHLORIDE BLD-SCNC: 95 MEQ/L (ref 95–107)
CHOLESTEROL, FASTING: 110 MG/DL (ref 0–199)
CO2: 23 MEQ/L (ref 20–31)
CREAT SERPL-MCNC: 0.91 MG/DL (ref 0.7–1.2)
EOSINOPHILS ABSOLUTE: 0.2 K/UL (ref 0–0.7)
EOSINOPHILS RELATIVE PERCENT: 2.5 %
GFR AFRICAN AMERICAN: >60
GFR NON-AFRICAN AMERICAN: >60
GLOBULIN: 2.8 G/DL (ref 2.3–3.5)
GLUCOSE BLD-MCNC: 209 MG/DL (ref 70–99)
HBA1C MFR BLD: 8 % (ref 4.8–5.9)
HCT VFR BLD CALC: 44.2 % (ref 42–52)
HDLC SERPL-MCNC: 51 MG/DL (ref 40–59)
HEMOGLOBIN: 14.9 G/DL (ref 14–18)
LDL CHOLESTEROL CALCULATED: 47 MG/DL (ref 0–129)
LYMPHOCYTES ABSOLUTE: 1.9 K/UL (ref 1–4.8)
LYMPHOCYTES RELATIVE PERCENT: 22.7 %
MCH RBC QN AUTO: 31.1 PG (ref 27–31.3)
MCHC RBC AUTO-ENTMCNC: 33.7 % (ref 33–37)
MCV RBC AUTO: 92.4 FL (ref 80–100)
MONOCYTES ABSOLUTE: 0.7 K/UL (ref 0.2–0.8)
MONOCYTES RELATIVE PERCENT: 8.6 %
NEUTROPHILS ABSOLUTE: 5.5 K/UL (ref 1.4–6.5)
NEUTROPHILS RELATIVE PERCENT: 65.6 %
PDW BLD-RTO: 12.8 % (ref 11.5–14.5)
PLATELET # BLD: 218 K/UL (ref 130–400)
POTASSIUM SERPL-SCNC: 4.4 MEQ/L (ref 3.4–4.9)
RBC # BLD: 4.78 M/UL (ref 4.7–6.1)
SODIUM BLD-SCNC: 132 MEQ/L (ref 135–144)
TOTAL PROTEIN: 7.2 G/DL (ref 6.3–8)
TRIGLYCERIDE, FASTING: 62 MG/DL (ref 0–150)
WBC # BLD: 8.4 K/UL (ref 4.8–10.8)

## 2022-05-09 PROCEDURE — 1036F TOBACCO NON-USER: CPT | Performed by: INTERNAL MEDICINE

## 2022-05-09 PROCEDURE — G8417 CALC BMI ABV UP PARAM F/U: HCPCS | Performed by: INTERNAL MEDICINE

## 2022-05-09 PROCEDURE — 2022F DILAT RTA XM EVC RTNOPTHY: CPT | Performed by: INTERNAL MEDICINE

## 2022-05-09 PROCEDURE — G8427 DOCREV CUR MEDS BY ELIG CLIN: HCPCS | Performed by: INTERNAL MEDICINE

## 2022-05-09 PROCEDURE — 99214 OFFICE O/P EST MOD 30 MIN: CPT | Performed by: INTERNAL MEDICINE

## 2022-05-09 PROCEDURE — 3052F HG A1C>EQUAL 8.0%<EQUAL 9.0%: CPT | Performed by: INTERNAL MEDICINE

## 2022-05-09 RX ORDER — METFORMIN HYDROCHLORIDE 500 MG/1
1000 TABLET, EXTENDED RELEASE ORAL 2 TIMES DAILY WITH MEALS
Qty: 360 TABLET | Refills: 1 | Status: SHIPPED | OUTPATIENT
Start: 2022-05-09 | End: 2022-08-19 | Stop reason: SDUPTHER

## 2022-05-09 RX ORDER — GLIPIZIDE 5 MG/1
5 TABLET, FILM COATED, EXTENDED RELEASE ORAL DAILY
Qty: 30 TABLET | Refills: 0 | Status: CANCELLED | OUTPATIENT
Start: 2022-05-09

## 2022-05-09 RX ORDER — FENOFIBRATE 145 MG/1
145 TABLET, COATED ORAL DAILY
Qty: 90 TABLET | Refills: 3 | Status: SHIPPED | OUTPATIENT
Start: 2022-05-09

## 2022-05-09 RX ORDER — ATORVASTATIN CALCIUM 20 MG/1
20 TABLET, FILM COATED ORAL DAILY
Qty: 90 TABLET | Refills: 3 | Status: SHIPPED | OUTPATIENT
Start: 2022-05-09

## 2022-05-09 RX ORDER — ISOPROPYL ALCOHOL 0.75 G/1
SWAB TOPICAL
Qty: 30 EACH | Refills: 11 | Status: SHIPPED | OUTPATIENT
Start: 2022-05-09

## 2022-05-09 RX ORDER — TADALAFIL 10 MG/1
10 TABLET ORAL DAILY PRN
Qty: 30 TABLET | Refills: 5 | Status: SHIPPED | OUTPATIENT
Start: 2022-05-09 | End: 2022-08-03 | Stop reason: SDUPTHER

## 2022-05-09 RX ORDER — DULAGLUTIDE 0.75 MG/.5ML
0.75 INJECTION, SOLUTION SUBCUTANEOUS WEEKLY
Qty: 4 PEN | Refills: 0 | Status: SHIPPED | OUTPATIENT
Start: 2022-05-09 | End: 2022-06-01 | Stop reason: DRUGHIGH

## 2022-05-09 RX ORDER — LANCETS 33 GAUGE
EACH MISCELLANEOUS
Qty: 30 EACH | Refills: 11 | Status: SHIPPED | OUTPATIENT
Start: 2022-05-09

## 2022-05-09 RX ORDER — CALCIUM CITRATE/VITAMIN D3 200MG-6.25
TABLET ORAL
Qty: 100 EACH | Refills: 11 | Status: SHIPPED | OUTPATIENT
Start: 2022-05-09

## 2022-05-09 ASSESSMENT — ENCOUNTER SYMPTOMS
EYE PAIN: 0
BACK PAIN: 0
SHORTNESS OF BREATH: 0
ABDOMINAL PAIN: 0

## 2022-05-09 NOTE — PATIENT INSTRUCTIONS
Patient Education        dulaglutide  Pronunciation: DOO la GLOO tide  Brand: Trulicity Pen  What is the most important information I should know about dulaglutide? You should not use dulaglutide if you have Multiple Endocrine Neoplasia syndrome type 2 (MEN 2), or a personal or family history of medullary thyroid carcinoma (a type of thyroid cancer). Do not use dulaglutide if you are in astate of diabetic ketoacidosis (call your doctor for treatment). In animal studies, dulaglutide caused thyroid tumors or thyroid cancer. It is not known whether these effects would occur in people using regulardoses. Ask your doctor about your risk. Call your doctor at once if you have signs of a thyroid tumor, such as swelling or a lump in your neck, trouble swallowing, a hoarse voice,or shortness of breath. What is dulaglutide? Dulaglutide is used together with diet and exercise to improve blood sugarcontrol in adults with type 2 diabetes mellitus. Dulaglutide is also used to help reduce the risk of serious heart problems suchas heart attack or stroke in adults who have type 2 diabetes and heart disease. This medicine is not for treating type 1 diabetes. Dulaglutide may also be used for purposes not listed in this medication guide. What should I discuss with my healthcare provider before using dulaglutide? You should not use dulaglutide if you are allergic to it, or if you have:   multiple endocrine neoplasia type 2 (tumors in your glands);   a personal or family history of medullary thyroid carcinoma (a type of thyroid cancer); or   diabetic ketoacidosis (call your doctor for treatment).   Tell your doctor if you have ever had:   pancreatitis;   a stomach or intestinal disorder;   gastroesophageal reflux disease (GERD) or slow digestion;   eye problems caused by diabetes (retinopathy);   liver or kidney disease;   if you also use insulin or oral diabetes medicine; or   if you have been sick with vomiting or diarrhea. In animal studies, dulaglutide caused thyroid tumors or thyroid cancer. It is not known whether these effects would occur in people using regulardoses. Ask your doctor about your risk. It is not known whether this medicine will harm an unborn baby. Tell yourdoctor if you are pregnant or plan to become pregnant. It may not be safe to breast-feed while using this medicine. Ask your doctorabout any risk. Dulaglutide is not approved for use by anyone younger than 25years old. How should I use dulaglutide? Follow all directions on your prescription label and read all medication guides or instruction sheets. Your doctor may occasionally change your dose. Use themedicine exactly as directed. Dulaglutide is injected under the skin once per week. Use dulaglutide on the same day each week at the same time of day. If you change your dosing day, allow at least 3 days to pass between doses. You may use dulaglutide with or without food. Read and carefully follow any Instructions for Use provided with your medicine. Ask your doctor or pharmacist if you do not understand these instructions. Your healthcare provider will show you where on your body to inject dulaglutide. Use a different place each time you give an injection. Do notinject into the same place two times in a row. You may have low blood sugar (hypoglycemia) and feel very hungry, dizzy, irritable, confused, anxious, or shaky. To quickly treat hypoglycemia, eat or drink a fast-acting source of sugar (fruitjuice, hard candy, crackers, raisins, or non-diet soda). Your doctor may prescribe a glucagon injection kit in case you have severe hypoglycemia. Be sure your family or close friends know how to give you thisinjection in an emergency. Also watch for signs of high blood sugar (hyperglycemia) such as increased thirst or urination. Blood sugar levels can be affected by stress, illness, surgery, exercise, alcohol use, or skipping meals.  Ask your doctor before changing your dose or medication schedule. Each injection pen or prefilled syringe is for one use only. Throw away after one use, even if there is still medicine left inside. Use a puncture-proof \"sharps\" container. Follow state or local laws about how to dispose of thiscontainer. Keep it out of the reach of children and pets. Store dulaglutide in the refrigerator, protected from light. Do not use past the expiration date on the medicine label. Do not freeze dulaglutide, and throw away the medicine if it has become frozen. You may also store dulaglutide at room temperature for up to 14 days before use. What happens if I miss a dose? Use the medicine as soon as you can, but skip the missed dose if your next dose is due in less than 3 days. Do not use two doses at one time. Do not use this medicine twice within a 72-hour period. What happens if I overdose? Seek emergency medical attention or call the Poison Help line at 1-176.558.7516. What should I avoid while using dulaglutide? Never share an injection pen or prefilled syringe with another person, even if the needle has been changed. Sharing these devices can allow infections or disease to pass from one personto another. What are the possible side effects of dulaglutide? Stop using dulaglutide and get emergency medical help if you have signs of an allergic reaction: hives; difficult breathing; feeling light-headed; swelling of your face, lips,tongue, or throat.   Call your doctor at once if you have:   pancreatitis --severe pain in your upper stomach spreading to your back, nausea and vomiting;   signs of a thyroid tumor --swelling or a lump in your neck, trouble swallowing, a hoarse voice, or if you feel short of breath;   low blood sugar --headache, hunger, weakness, sweating, confusion, irritability, dizziness, fast heart rate, or feeling jittery; or   kidney problems --little or no urination, swelling in your feet or ankles, feeling tired or short of breath. Tell your doctor if you are sick with vomiting or diarrhea, or if you are sweating more than usual. You can easily become dehydrated while usingdulaglutide. This can lead to kidney failure. Common side effects may include:   nausea, vomiting, stomach pain;   diarrhea; or   loss of appetite. This is not a complete list of side effects and others may occur. Call your doctor for medical advice about side effects. You may report side effects toFDA at 8-202-XLP-3474. What other drugs will affect dulaglutide? Dulaglutide can slow your digestion, and it may take longer for your body toabsorb any medicines you take by mouth. Other drugs may affect dulaglutide, including prescription and over-the-counter medicines, vitamins, and herbal products. Tell your doctor about all yourcurrent medicines and any medicine you start or stop using. Where can I get more information? Your pharmacist can provide more information about dulaglutide. Remember, keep this and all other medicines out of the reach of children, never share your medicines with others, and use this medication only for the indication prescribed. Every effort has been made to ensure that the information provided by [address] is accurate, up-to-date, and complete, but no guarantee is made to that effect. Drug information contained herein may be time sensitive. [employer] information has been compiled for use by healthcare practitioners and consumers in the [place] and therefore [employer] does not warrant that uses outside of the [place] are appropriate, unless specifically indicated otherwise. [employer] drug information does not endorse drugs, diagnose patients or recommend therapy.  [employer] drug information is an informational resource designed to assist licensed healthcare practitioners in caring for their patients and/or to serve consumers viewing this service as a supplement to, and not a substitute for, the expertise, skill, knowledge and judgment of healthcare practitioners. The absence of a warning for a given drug or drug combination in no way should be construed to indicate that the drug or drug combination is safe, effective or appropriate for any given patient. [facility] does not assume any responsibility for any aspect of healthcare administered with the aid of information [facility] provides. The information contained herein is not intended to cover all possible uses, directions, precautions, warnings, drug interactions, allergic reactions, or adverse effects. If you have questions about the drugs you are taking, check with yourdoctor, nurse or pharmacist.  Copyright 0508-5774 [facility] [place]. Version: 4.02. Revision date: 10/8/2020. Care instructions adapted under license by [facility] ([facility]). If you have questions about a medical condition or this instruction, always ask your healthcare professional. [address]  any warranty or liability for your use of this information.

## 2022-05-09 NOTE — PROGRESS NOTES
Subjective:      Patient ID: Tatiana Jackson is a 40 y.o. male who presents today with:  Chief Complaint   Patient presents with    3 Month Follow-Up     pt is here today for 3 mnth f/u       HPI      Here for follow up   No past medical history on file. No past surgical history on file. Social History     Socioeconomic History    Marital status: Unknown     Spouse name: Not on file    Number of children: Not on file    Years of education: Not on file    Highest education level: Not on file   Occupational History    Not on file   Tobacco Use    Smoking status: Former Smoker     Packs/day: 0.25     Years: 5.00     Pack years: 1.25     Types: Cigarettes     Quit date: 9/28/2011     Years since quitting: 10.6    Smokeless tobacco: Former User     Quit date: 2011   Vaping Use    Vaping Use: Not on file   Substance and Sexual Activity    Alcohol use: Yes     Comment: social     Drug use: Never    Sexual activity: Not on file   Other Topics Concern    Not on file   Social History Narrative    Not on file     Social Determinants of Health     Financial Resource Strain: Low Risk     Difficulty of Paying Living Expenses: Not hard at all   Food Insecurity: No Food Insecurity    Worried About [address] [facility] in the Last Year: Never true    [address] in the Last Year: Never true   Transportation Needs:     Lack of Transportation (Medical): Not on file    Lack of Transportation (Non-Medical):  Not on file   Physical Activity:     Days of Exercise per Week: Not on file    Minutes of Exercise per Session: Not on file   Stress:     Feeling of Stress : Not on file   Social Connections:     Frequency of Communication with Friends and Family: Not on file    Frequency of Social Gatherings with Friends and Family: Not on file    Attends Christianity Services: Not on file    Active Member of Clubs or Organizations: Not on file    Attends Club or Organization Meetings: Not on file    Marital Status: Not on file   Intimate Partner Violence:     Fear of Current or Ex-Partner: Not on file    Emotionally Abused: Not on file    Physically Abused: Not on file    Sexually Abused: Not on file   Housing Stability:     Unable to Pay for Housing in the Last Year: Not on file    Number of [place] in the Last Year: Not on file    Unstable Housing in the Last Year: Not on file     No Known Allergies  Current Outpatient Medications on File Prior to Visit   Medication Sig Dispense Refill    vitamin D 25 MCG (1000 UT) CAPS Take by mouth daily       No current facility-administered medications on file prior to visit. I have personally reviewed the ROS, PMH, PFH, and social history     Review of Systems   Constitutional: Negative for chills. HENT: Negative for congestion. Eyes: Negative for pain. Respiratory: Negative for shortness of breath. Cardiovascular: Negative for chest pain. Gastrointestinal: Negative for abdominal pain. Genitourinary: Negative for hematuria. Musculoskeletal: Negative for back pain. Allergic/Immunologic: Negative for immunocompromised state. Neurological: Negative for headaches. Psychiatric/Behavioral: Negative for hallucinations. Objective:   /74 (Site: Left Upper Arm, Position: Sitting, Cuff Size: Large Adult)   Pulse 90   Temp 97.3 °F (36.3 °C) (Temporal)   Wt 260 lb (117.9 kg)   SpO2 96%   BMI 35.26 kg/m²     Physical Exam  Constitutional:       General: He is not in acute distress. Appearance: Normal appearance. He is not ill-appearing, toxic-appearing or diaphoretic. HENT:      Head: Normocephalic. Neck:      Vascular: No carotid bruit. Cardiovascular:      Rate and Rhythm: Normal rate and regular rhythm. Pulses: Normal pulses. Heart sounds: Normal heart sounds. No murmur heard. No friction rub. No gallop. Pulmonary:      Effort: Pulmonary effort is normal. No respiratory distress. Breath sounds: Normal breath sounds. No wheezing, rhonchi or rales. Abdominal:      General: Abdomen is flat. There is no distension. Palpations: Abdomen is soft. Tenderness: There is no abdominal tenderness. There is no right CVA tenderness, left CVA tenderness, guarding or rebound. Musculoskeletal:      Cervical back: Neck supple. Right lower leg: No edema. Left lower leg: No edema. Skin:     General: Skin is warm. Findings: No erythema or rash. Neurological:      Mental Status: He is alert. Psychiatric:         Mood and Affect: Mood normal.       Didn't want foot exam.     Assessment:       Diagnosis Orders   1. Uncontrolled type 2 diabetes mellitus with hyperglycemia (HCC)  blood glucose monitor kit and supplies    metFORMIN (GLUCOPHAGE-XR) 500 MG extended release tablet    Dulaglutide (TRULICITY) 9.32 ZG/6.5VC Time [place] Margean Dales, Optometry, [facility]    Comprehensive Metabolic Panel    Hemoglobin A1C    CBC with Auto Differential   2. High alanine aminotransferase (ALT) level  Lipid, Fasting    Comprehensive Metabolic Panel    CBC with Auto Differential   3. Hyperlipidemia, unspecified hyperlipidemia type  atorvastatin (LIPITOR) 20 MG tablet    blood glucose monitor kit and supplies    Lipid, Fasting    Comprehensive Metabolic Panel    CBC with Auto Differential   4. Hypertriglyceridemia  blood glucose monitor kit and supplies    fenofibrate (TRICOR) 145 MG tablet    Lipid, Fasting    Comprehensive Metabolic Panel    CBC with Auto Differential   5. Erectile dysfunction, unspecified erectile dysfunction type  tadalafil (CIALIS) 10 MG tablet    Comprehensive Metabolic Panel    CBC with Auto Differential         Plan:     vc.  Add trulicity, plan to titrate to 3 mg once a week  See orders.              Orders Placed This Encounter   Procedures    Lipid, Fasting     Standing Status:   Future     Standing Expiration Date:   5/9/2023    Comprehensive Metabolic Panel     Standing Status:   Future     Standing Expiration Date:   5/9/2023    Hemoglobin A1C     Standing Status:   Future     Standing Expiration Date:   5/9/2023    CBC with Auto Differential     Standing Status:   Future     Standing Expiration Date:   5/9/2023   [address], [place]     Referral Priority:   Routine     Referral Type:   Eval and Treat     Referral Reason:   Specialty Services Required     Requested Specialty:   Nutrition     Number of Visits Requested:   1    NICKIE - Christel Gutierrez, Optometry, [facility]     Referral Priority:   Routine     Referral Type:   Eval and Treat     Referral Reason:   Specialty Services Required     Referred to Provider:   Sherri Palmer Specialty:   Optometry     Number of Visits Requested:   1     Orders Placed This Encounter   Medications    atorvastatin (LIPITOR) 20 MG tablet     Sig: Take 1 tablet by mouth daily     Dispense:  90 tablet     Refill:  3    blood glucose monitor kit and supplies     Sig: Dispense sufficient amount for indicated testing frequency plus additional to accommodate PRN testing needs. Dispense all needed supplies to include: monitor, strips, lancing device, lancets, control solutions, alcohol swabs. Patient tests once daily     Dispense:  1 kit     Refill:  0     Brand per patient preference. May round up to next available package size.  fenofibrate (TRICOR) 145 MG tablet     Sig: Take 1 tablet by mouth daily     Dispense:  90 tablet     Refill:  3    metFORMIN (GLUCOPHAGE-XR) 500 MG extended release tablet     Sig: Take 2 tablets by mouth 2 times daily (with meals)     Dispense:  360 tablet     Refill:  1    tadalafil (CIALIS) 10 MG tablet     Sig: Take 1 tablet by mouth daily as needed for Erectile Dysfunction Max 1 tablet per 24 hour period. Dispense:  30 tablet     Refill:  5    Alcohol Swabs (B-D SINGLE USE SWABS REGULAR) PADS     Sig: test once daily.      Dispense:  30 each     Refill:  11    Drug [place] Unilet Lancets 33G MISC     Sig: test once daily. Dispense:  30 each     Refill:  11    TRUE METRIX BLOOD GLUCOSE TEST strip     Sig: test once daily. Dispense:  100 each     Refill:  11    Dulaglutide (TRULICITY) 5.18 UP/6.5RI SOPN     Sig: Inject 0.75 mg into the skin once a week     Dispense:  4 pen     Refill:  0   Please schedule opto  No fh or personal history of thyroid cancer   NO MEN  No personal history of pancreatitis. If anything should change or worsen call ASAP, don't wait for next scheduled appointment. Return in about 4 months (around 9/9/2022) for Chronic condition management/appointment, worsening symptoms, call ASAP for appointment.       Radhika Deshpande MD

## 2022-05-31 DIAGNOSIS — E11.65 UNCONTROLLED TYPE 2 DIABETES MELLITUS WITH HYPERGLYCEMIA (HCC): ICD-10-CM

## 2022-05-31 RX ORDER — DULAGLUTIDE 0.75 MG/.5ML
0.75 INJECTION, SOLUTION SUBCUTANEOUS WEEKLY
Qty: 4 PEN | Refills: 0 | Status: CANCELLED | OUTPATIENT
Start: 2022-05-31

## 2022-06-01 RX ORDER — DULAGLUTIDE 1.5 MG/.5ML
1.5 INJECTION, SOLUTION SUBCUTANEOUS WEEKLY
Qty: 4 PEN | Refills: 0 | Status: SHIPPED | OUTPATIENT
Start: 2022-06-01 | End: 2022-07-05 | Stop reason: SDUPTHER

## 2022-07-07 RX ORDER — DULAGLUTIDE 1.5 MG/.5ML
1.5 INJECTION, SOLUTION SUBCUTANEOUS WEEKLY
Qty: 12 PEN | Refills: 1 | Status: SHIPPED | OUTPATIENT
Start: 2022-07-07 | End: 2022-08-03 | Stop reason: SDUPTHER

## 2022-08-03 DIAGNOSIS — E78.5 HYPERLIPIDEMIA, UNSPECIFIED HYPERLIPIDEMIA TYPE: ICD-10-CM

## 2022-08-03 DIAGNOSIS — N52.9 ERECTILE DYSFUNCTION, UNSPECIFIED ERECTILE DYSFUNCTION TYPE: ICD-10-CM

## 2022-08-03 DIAGNOSIS — E78.1 HYPERTRIGLYCERIDEMIA: ICD-10-CM

## 2022-08-03 DIAGNOSIS — E11.65 UNCONTROLLED TYPE 2 DIABETES MELLITUS WITH HYPERGLYCEMIA (HCC): ICD-10-CM

## 2022-08-04 NOTE — TELEPHONE ENCOUNTER
requesting medication refill. Please approve or deny this request.    Rx requested:  Requested Prescriptions     Pending Prescriptions Disp Refills    blood glucose monitor kit and supplies 1 kit 0     Sig: Dispense sufficient amount for indicated testing frequency plus additional to accommodate PRN testing needs. Dispense all needed supplies to include: monitor, strips, lancing device, lancets, control solutions, alcohol swabs. Patient tests once daily    tadalafil (CIALIS) 10 MG tablet 30 tablet 5     Sig: Take 1 tablet by mouth daily as needed for Erectile Dysfunction Max 1 tablet per 24 hour period.     Dulaglutide (TRULICITY) 1.5 PF/8.6NA SOPN 12 pen 1     Sig: Inject 1.5 mg into the skin once a week         Last Office Visit:   5/9/2022      Next Visit Date:  Future Appointments   Date Time Provider Flory Gates   8/31/2022 10:30 AM SCHEDULE, NUTRITIONIST [address] [address]   9/8/2022  9:00 AM Leigha Parisi  [place], [place] [address]

## 2022-08-05 RX ORDER — DULAGLUTIDE 1.5 MG/.5ML
1.5 INJECTION, SOLUTION SUBCUTANEOUS WEEKLY
Qty: 12 PEN | Refills: 0 | Status: SHIPPED | OUTPATIENT
Start: 2022-08-05 | End: 2022-09-08 | Stop reason: DRUGHIGH

## 2022-08-05 RX ORDER — TADALAFIL 10 MG/1
10 TABLET ORAL DAILY PRN
Qty: 30 TABLET | Refills: 5 | Status: SHIPPED | OUTPATIENT
Start: 2022-08-05 | End: 2022-08-19 | Stop reason: SDUPTHER

## 2022-08-19 DIAGNOSIS — N52.9 ERECTILE DYSFUNCTION, UNSPECIFIED ERECTILE DYSFUNCTION TYPE: ICD-10-CM

## 2022-08-19 DIAGNOSIS — E11.65 UNCONTROLLED TYPE 2 DIABETES MELLITUS WITH HYPERGLYCEMIA (HCC): ICD-10-CM

## 2022-08-19 RX ORDER — METFORMIN HYDROCHLORIDE 500 MG/1
1000 TABLET, EXTENDED RELEASE ORAL 2 TIMES DAILY WITH MEALS
Qty: 360 TABLET | Refills: 1 | Status: SHIPPED | OUTPATIENT
Start: 2022-08-19

## 2022-08-19 RX ORDER — TADALAFIL 10 MG/1
10 TABLET ORAL DAILY PRN
Qty: 30 TABLET | Refills: 5 | Status: SHIPPED | OUTPATIENT
Start: 2022-08-19 | End: 2022-09-28 | Stop reason: SDUPTHER

## 2022-08-31 ENCOUNTER — HOSPITAL ENCOUNTER (OUTPATIENT)
Dept: NUTRITION | Age: 37
Discharge: HOME | End: 2022-08-31
Payer: COMMERCIAL

## 2022-08-31 PROCEDURE — 97802 MEDICAL NUTRITION INDIV IN: CPT

## 2022-09-06 RX ORDER — DULAGLUTIDE 1.5 MG/.5ML
1.5 INJECTION, SOLUTION SUBCUTANEOUS WEEKLY
OUTPATIENT
Start: 2022-09-06

## 2022-09-07 NOTE — PROGRESS NOTES
OUTPATIENT NUTRITION COUNSELING       Celso Armas is a 40 y.o.  male     Reason for Counseling: Dm2    Subjective/Current Data:  Pt presents with newly diagnosed DM2. He admits he is not checking his blood sugars at home. No food allergies. Admits to avoiding carbs. Drinks 2-3 cups of water daily and 2 diet cokes. Drinks 2-3 shots of whisky nightly and 3-4 cups of coffee with SF creamer. He reports eating fast food 2-3 x /week. 24 hour recall:  Breakfast= 1 cup milk, large bowl of grapenuts with milk, eggs  Lunch=ham sandwich, chips  Snack=red bull  Dinner=chicken and salad  Snack=[employer] burrito         Past Medical History:  No past medical history on file. No past surgical history on file. Labs:    Chemistry CBC/Coags Misc. No results for input(s): NA, K, CL, CO2, BUN, CREATININE, GLUCOSE, CA in the last 72 hours. No results for input(s): PHOS in the last 72 hours. No results for input(s): WBC, RBC, HGB, HCT, MCV, MCH, MCHC, RDW, PLT, MPV in the last 72 hours. No results for input(s): LABALBU in the last 72 hours. Invalid input(s):  PREALBUMIN  Lab Results   Component Value Date/Time    LABA1C 8.0 05/09/2022 10:58 AM            Anthropometric Measures:  Height: 6'1\"  Weight: 117.9 kg  Ideal Body Weight: 83.6 kg  Ideal Body Weight %: >100%  BMI: 35.26 Obese Class II    Wt Readings from Last 20 Encounters:   05/09/22 260 lb (117.9 kg)   02/09/22 238 lb (108 kg)   11/08/21 237 lb (107.5 kg)   09/28/21 234 lb (106.1 kg)       Assessment and Plan: I instructed the pt on basic diabetic diet guidelines. We discussed carb counting, foods that contained carbs, and how to incorporate the correct portions at each meal. I reviewed the importance of not skipping meals. Emphasis was placed on having a set meal schedule, consistent carb intake, and getting regular physical activity. We reviewed the importance of carb intake and the role the macronutrient plays in the body. Together we practiced counting carbs and coming up with a meal plan that works best with the pt's lifestyle. Pt was receptive to education and asked appropriate questions. Readiness to change is high. Nutritional Requirements:  Estimated Energy Needs:  Weight Used: 117.9kg   Method Used: 14-15kcal/kg   Estimated kcal Needs: 1600- 1700kcal per day  Protein Needs:  Weight used: 83.6 kg  1-1.2 g/kg = 83- 100g per day        Nutrition Diagnosis and Goal  Problem: Food and nutrition related knowledge deficit  Etiology/related to: No prior nutrition education   Symptoms/Signs/as evidenced by: 24 hour food recall, BMI, labs       Goals: Include 3-4 carb servings per meal  1-2 carb servings for evening snack   Check blood sugars regularly   Decrease alcohol intake  Drink at least 9-10 cups of water daily     Education Needs: Provided Diabetic guidelines, food label instructions, 1600 5 day menu, DM snacks        NUTRITION RECOMMENDATIONS / MONITORING / EVALUATION  1. Name and Office phone number given for reference.   2. F/u in 6 months    Breanna Mock MS, RD, LD

## 2022-09-08 ENCOUNTER — OFFICE VISIT (OUTPATIENT)
Dept: FAMILY MEDICINE CLINIC | Age: 37
End: 2022-09-08
Payer: COMMERCIAL

## 2022-09-08 VITALS
WEIGHT: 262 LBS | OXYGEN SATURATION: 98 % | BODY MASS INDEX: 35.53 KG/M2 | TEMPERATURE: 97.4 F | SYSTOLIC BLOOD PRESSURE: 136 MMHG | DIASTOLIC BLOOD PRESSURE: 82 MMHG | HEART RATE: 94 BPM

## 2022-09-08 DIAGNOSIS — N52.9 ERECTILE DYSFUNCTION, UNSPECIFIED ERECTILE DYSFUNCTION TYPE: ICD-10-CM

## 2022-09-08 DIAGNOSIS — R73.9 HYPERGLYCEMIA: ICD-10-CM

## 2022-09-08 DIAGNOSIS — E11.65 UNCONTROLLED TYPE 2 DIABETES MELLITUS WITH HYPERGLYCEMIA (HCC): ICD-10-CM

## 2022-09-08 DIAGNOSIS — E66.01 CLASS 2 SEVERE OBESITY DUE TO EXCESS CALORIES WITH SERIOUS COMORBIDITY AND BODY MASS INDEX (BMI) OF 35.0 TO 35.9 IN ADULT (HCC): ICD-10-CM

## 2022-09-08 DIAGNOSIS — R86.8 LOW VOLUME OF EJACULATED SEMEN: ICD-10-CM

## 2022-09-08 DIAGNOSIS — E11.65 UNCONTROLLED TYPE 2 DIABETES MELLITUS WITH HYPERGLYCEMIA (HCC): Primary | ICD-10-CM

## 2022-09-08 DIAGNOSIS — E78.5 HYPERLIPIDEMIA, UNSPECIFIED HYPERLIPIDEMIA TYPE: ICD-10-CM

## 2022-09-08 DIAGNOSIS — L98.9 SKIN LESION: ICD-10-CM

## 2022-09-08 DIAGNOSIS — E78.1 HYPERTRIGLYCERIDEMIA: ICD-10-CM

## 2022-09-08 DIAGNOSIS — R74.01 HIGH ALANINE AMINOTRANSFERASE (ALT) LEVEL: ICD-10-CM

## 2022-09-08 LAB
ALBUMIN SERPL-MCNC: 4.8 G/DL (ref 3.5–4.6)
ALP BLD-CCNC: 61 U/L (ref 35–104)
ALT SERPL-CCNC: 51 U/L (ref 0–41)
ANION GAP SERPL CALCULATED.3IONS-SCNC: 16 MEQ/L (ref 9–15)
AST SERPL-CCNC: 34 U/L (ref 0–40)
BILIRUB SERPL-MCNC: 0.5 MG/DL (ref 0.2–0.7)
BUN BLDV-MCNC: 12 MG/DL (ref 6–20)
CALCIUM SERPL-MCNC: 9.4 MG/DL (ref 8.5–9.9)
CHLORIDE BLD-SCNC: 101 MEQ/L (ref 95–107)
CHOLESTEROL, FASTING: 118 MG/DL (ref 0–199)
CO2: 23 MEQ/L (ref 20–31)
CREAT SERPL-MCNC: 0.96 MG/DL (ref 0.7–1.2)
GFR AFRICAN AMERICAN: >60
GFR NON-AFRICAN AMERICAN: >60
GLOBULIN: 2.9 G/DL (ref 2.3–3.5)
GLUCOSE BLD-MCNC: 196 MG/DL (ref 70–99)
HBA1C MFR BLD: 7.5 % (ref 4.8–5.9)
HDLC SERPL-MCNC: 43 MG/DL (ref 40–59)
LDL CHOLESTEROL CALCULATED: 57 MG/DL (ref 0–129)
POTASSIUM SERPL-SCNC: 4.3 MEQ/L (ref 3.4–4.9)
SODIUM BLD-SCNC: 140 MEQ/L (ref 135–144)
TOTAL PROTEIN: 7.7 G/DL (ref 6.3–8)
TRIGLYCERIDE, FASTING: 91 MG/DL (ref 0–150)

## 2022-09-08 PROCEDURE — G8427 DOCREV CUR MEDS BY ELIG CLIN: HCPCS | Performed by: INTERNAL MEDICINE

## 2022-09-08 PROCEDURE — 2022F DILAT RTA XM EVC RTNOPTHY: CPT | Performed by: INTERNAL MEDICINE

## 2022-09-08 PROCEDURE — 99214 OFFICE O/P EST MOD 30 MIN: CPT | Performed by: INTERNAL MEDICINE

## 2022-09-08 PROCEDURE — 90471 IMMUNIZATION ADMIN: CPT | Performed by: INTERNAL MEDICINE

## 2022-09-08 PROCEDURE — 1036F TOBACCO NON-USER: CPT | Performed by: INTERNAL MEDICINE

## 2022-09-08 PROCEDURE — 90677 PCV20 VACCINE IM: CPT | Performed by: INTERNAL MEDICINE

## 2022-09-08 PROCEDURE — G8417 CALC BMI ABV UP PARAM F/U: HCPCS | Performed by: INTERNAL MEDICINE

## 2022-09-08 PROCEDURE — 3052F HG A1C>EQUAL 8.0%<EQUAL 9.0%: CPT | Performed by: INTERNAL MEDICINE

## 2022-09-08 RX ORDER — FLASH GLUCOSE SENSOR
KIT MISCELLANEOUS
Qty: 6 EACH | Refills: 3 | Status: SHIPPED | OUTPATIENT
Start: 2022-09-08

## 2022-09-08 RX ORDER — FLASH GLUCOSE SENSOR
KIT MISCELLANEOUS
Qty: 1 EACH | Refills: 1 | Status: SHIPPED | OUTPATIENT
Start: 2022-09-08

## 2022-09-08 ASSESSMENT — PATIENT HEALTH QUESTIONNAIRE - PHQ9
2. FEELING DOWN, DEPRESSED OR HOPELESS: 0
SUM OF ALL RESPONSES TO PHQ9 QUESTIONS 1 & 2: 0
SUM OF ALL RESPONSES TO PHQ QUESTIONS 1-9: 0
SUM OF ALL RESPONSES TO PHQ QUESTIONS 1-9: 0
1. LITTLE INTEREST OR PLEASURE IN DOING THINGS: 0
SUM OF ALL RESPONSES TO PHQ QUESTIONS 1-9: 0
SUM OF ALL RESPONSES TO PHQ QUESTIONS 1-9: 0

## 2022-09-08 ASSESSMENT — ENCOUNTER SYMPTOMS
SHORTNESS OF BREATH: 0
ABDOMINAL PAIN: 0
BACK PAIN: 0
EYE PAIN: 0

## 2022-09-28 DIAGNOSIS — N52.9 ERECTILE DYSFUNCTION, UNSPECIFIED ERECTILE DYSFUNCTION TYPE: ICD-10-CM

## 2022-09-28 DIAGNOSIS — E11.65 UNCONTROLLED TYPE 2 DIABETES MELLITUS WITH HYPERGLYCEMIA (HCC): ICD-10-CM

## 2022-09-28 RX ORDER — TADALAFIL 10 MG/1
10 TABLET ORAL DAILY PRN
Qty: 30 TABLET | Refills: 5 | Status: SHIPPED | OUTPATIENT
Start: 2022-09-28 | End: 2022-10-31 | Stop reason: SDUPTHER

## 2022-10-31 DIAGNOSIS — N52.9 ERECTILE DYSFUNCTION, UNSPECIFIED ERECTILE DYSFUNCTION TYPE: ICD-10-CM

## 2022-10-31 RX ORDER — TADALAFIL 10 MG/1
10 TABLET ORAL DAILY PRN
Qty: 30 TABLET | Refills: 5 | Status: SHIPPED | OUTPATIENT
Start: 2022-10-31

## 2022-10-31 NOTE — TELEPHONE ENCOUNTER
Pharmacy requesting medication refill. Please approve or deny this request.    Rx requested:  Requested Prescriptions     Pending Prescriptions Disp Refills    tadalafil (CIALIS) 10 MG tablet 30 tablet 5     Sig: Take 1 tablet by mouth daily as needed for Erectile Dysfunction Max 1 tablet per 24 hour period.          Last Office Visit:   9/8/2022      Next Visit Date:  Future Appointments   Date Time Provider Flory Gates   12/27/2022  9:00 AM Cherri Amador, 1982 [address]

## 2022-12-27 ENCOUNTER — OFFICE VISIT (OUTPATIENT)
Dept: FAMILY MEDICINE CLINIC | Age: 37
End: 2022-12-27

## 2022-12-27 VITALS
RESPIRATION RATE: 18 BRPM | WEIGHT: 256 LBS | OXYGEN SATURATION: 98 % | SYSTOLIC BLOOD PRESSURE: 120 MMHG | HEIGHT: 73 IN | DIASTOLIC BLOOD PRESSURE: 86 MMHG | HEART RATE: 104 BPM | TEMPERATURE: 98.7 F | BODY MASS INDEX: 33.93 KG/M2

## 2022-12-27 DIAGNOSIS — Z23 NEED FOR INFLUENZA VACCINATION: ICD-10-CM

## 2022-12-27 DIAGNOSIS — E11.65 UNCONTROLLED TYPE 2 DIABETES MELLITUS WITH HYPERGLYCEMIA (HCC): Primary | ICD-10-CM

## 2022-12-27 LAB — HBA1C MFR BLD: 8.9 %

## 2022-12-27 PROCEDURE — 99214 OFFICE O/P EST MOD 30 MIN: CPT | Performed by: NURSE PRACTITIONER

## 2022-12-27 PROCEDURE — 3052F HG A1C>EQUAL 8.0%<EQUAL 9.0%: CPT | Performed by: NURSE PRACTITIONER

## 2022-12-27 PROCEDURE — 90674 CCIIV4 VAC NO PRSV 0.5 ML IM: CPT | Performed by: NURSE PRACTITIONER

## 2022-12-27 PROCEDURE — 90471 IMMUNIZATION ADMIN: CPT | Performed by: NURSE PRACTITIONER

## 2022-12-27 PROCEDURE — 83036 HEMOGLOBIN GLYCOSYLATED A1C: CPT | Performed by: NURSE PRACTITIONER

## 2022-12-27 RX ORDER — ORAL SEMAGLUTIDE 3 MG/1
1 TABLET ORAL DAILY
Qty: 60 TABLET | Refills: 0 | COMMUNITY
Start: 2022-12-27

## 2022-12-27 SDOH — ECONOMIC STABILITY: FOOD INSECURITY: WITHIN THE PAST 12 MONTHS, YOU WORRIED THAT YOUR FOOD WOULD RUN OUT BEFORE YOU GOT MONEY TO BUY MORE.: NEVER TRUE

## 2022-12-27 SDOH — ECONOMIC STABILITY: FOOD INSECURITY: WITHIN THE PAST 12 MONTHS, THE FOOD YOU BOUGHT JUST DIDN'T LAST AND YOU DIDN'T HAVE MONEY TO GET MORE.: NEVER TRUE

## 2022-12-27 ASSESSMENT — SOCIAL DETERMINANTS OF HEALTH (SDOH): HOW HARD IS IT FOR YOU TO PAY FOR THE VERY BASICS LIKE FOOD, HOUSING, MEDICAL CARE, AND HEATING?: NOT HARD AT ALL

## 2022-12-27 ASSESSMENT — ENCOUNTER SYMPTOMS
ABDOMINAL PAIN: 0
BACK PAIN: 0
SHORTNESS OF BREATH: 0
EYE PAIN: 0

## 2022-12-27 NOTE — PROGRESS NOTES
Subjective:     Patient ID: Lima Lawler is a 40 y.o. male who presentstoday for:  Chief Complaint   Patient presents with    Establish Care     Patient is here to establish care today. Patient's previous PCP was Dr. Ade Pierce. HPI  New patient establish care chronic disease management  Has been out of [place] due to insurance states cant get again until Feb    No past medical history on file. Current Outpatient Medications on File Prior to Visit   Medication Sig Dispense Refill    tadalafil (CIALIS) 10 MG tablet Take 1 tablet by mouth daily as needed for Erectile Dysfunction Max 1 tablet per 24 hour period. 30 tablet 5    Dulaglutide 4.5 MG/0.5ML SOPN Inject 4.5 mg into the skin once a week 12 Adjustable Dose Pre-filled Pen Syringe 0    Continuous Blood Gluc Sensor (ViacorSTYLE CLAUDY 14 DAY SENSOR) MISC Use as directed every 14 days 6 each 3    Continuous Blood Gluc  (FREESTYLE CLAUDY READER) GILBERT USE AS DIRECTED 1 each 1    metFORMIN (GLUCOPHAGE-XR) 500 MG extended release tablet Take 2 tablets by mouth 2 times daily (with meals) 360 tablet 1    blood glucose monitor kit and supplies Dispense sufficient amount for indicated testing frequency plus additional to accommodate PRN testing needs. Dispense all needed supplies to include: monitor, strips, lancing device, lancets, control solutions, alcohol swabs. Patient tests once daily 1 kit 0    atorvastatin (LIPITOR) 20 MG tablet Take 1 tablet by mouth daily 90 tablet 3    fenofibrate (TRICOR) 145 MG tablet Take 1 tablet by mouth daily 90 tablet 3    Alcohol Swabs (B-D SINGLE USE SWABS REGULAR) PADS test once daily. 30 each 11    Drug [place] Unilet Lancets 33G MISC test once daily. 30 each 11    TRUE METRIX BLOOD GLUCOSE TEST strip test once daily. 100 each 11    vitamin D 25 MCG (1000 UT) CAPS Take by mouth daily       No current facility-administered medications on file prior to visit. No past surgical history on file.      Family History   Problem Relation Age of Onset    Other Sister         NO FDR or SDR WITH CANCER    Other Maternal Grandfather         no congenital heart disease in family    Other Paternal Grandfather         NO FH OF ANEURYSMS     Social History     Socioeconomic History    Marital status: Unknown     Spouse name: Not on file    Number of children: Not on file    Years of education: Not on file    Highest education level: Not on file   Occupational History    Not on file   Tobacco Use    Smoking status: Former     Packs/day: 0.25     Years: 5.00     Pack years: 1.25     Types: Cigarettes     Quit date: 2011     Years since quittin.2    Smokeless tobacco: Former     Quit date:    Vaping Use    Vaping Use: Not on file   Substance and Sexual Activity    Alcohol use: Yes     Comment: social     Drug use: Never    Sexual activity: Not on file   Other Topics Concern    Not on file   Social History Narrative    Not on file     Social Determinants of Health     Financial Resource Strain: Low Risk     Difficulty of Paying Living Expenses: Not hard at all   Food Insecurity: No Food Insecurity    Worried About Running Out of Food in the Last Year: Never true    Ran Out of Food in the Last Year: Never true   Transportation Needs: Not on file   Physical Activity: Not on file   Stress: Not on file   Social Connections: Not on file   Intimate Partner Violence: Not on file   Housing Stability: Not on file     Allergies:  Patient has no known allergies. Review of Systems   Constitutional:  Negative for chills. HENT:  Negative for congestion. Eyes:  Negative for pain. Respiratory:  Negative for shortness of breath. Cardiovascular:  Negative for chest pain. Gastrointestinal:  Negative for abdominal pain. Genitourinary:  Negative for hematuria. Musculoskeletal:  Negative for back pain. Allergic/Immunologic: Negative for immunocompromised state. Neurological:  Negative for headaches.    Psychiatric/Behavioral:  Negative for hallucinations. Objective:    /86 (Site: Left Upper Arm, Position: Sitting, Cuff Size: Medium Adult)   Pulse (!) 104   Temp 98.7 °F (37.1 °C) (Infrared)   Resp 18   Ht 6' 1\" (1.854 m)   Wt 256 lb (116.1 kg)   SpO2 98%   BMI 33.78 kg/m²     Physical Exam  Constitutional:       General: He is not in acute distress. Appearance: He is well-developed. He is not diaphoretic. HENT:      Head: Normocephalic and atraumatic. Right Ear: External ear normal.      Left Ear: External ear normal.      Mouth/Throat:      Mouth: Mucous membranes are moist.   Eyes:      Extraocular Movements: Extraocular movements intact. Conjunctiva/sclera: Conjunctivae normal.      Pupils: Pupils are equal, round, and reactive to light. Cardiovascular:      Rate and Rhythm: Normal rate and regular rhythm. Heart sounds: Normal heart sounds. Pulmonary:      Effort: Pulmonary effort is normal. No respiratory distress. Breath sounds: Normal breath sounds. No wheezing. Abdominal:      General: Bowel sounds are normal.      Palpations: Abdomen is soft. Tenderness: There is no abdominal tenderness. Musculoskeletal:         General: Normal range of motion. Cervical back: Normal range of motion and neck supple. No tenderness. Right lower leg: No edema. Left lower leg: No edema. Lymphadenopathy:      Cervical: No cervical adenopathy. Skin:     General: Skin is warm and dry. Findings: No rash. Neurological:      General: No focal deficit present. Mental Status: He is alert and oriented to person, place, and time. Psychiatric:         Mood and Affect: Mood normal.         Behavior: Behavior normal.         Thought Content: Thought content normal.         Judgment: Judgment normal.       Assessment & Plan:       Diagnosis Orders   1.  Uncontrolled type 2 diabetes mellitus with hyperglycemia (HCC)  POCT glycosylated hemoglobin (Hb A1C)    Semaglutide (RYBELSUS) 3 MG TABS 2. Need for influenza vaccination  Influenza, FLUCELVAX, (age 10 mo+), IM, Preservative Free, 0.5 mL        Orders Placed This Encounter   Procedures    Influenza, FLUCELVAX, (age 10 mo+), IM, Preservative Free, 0.5 mL    POCT glycosylated hemoglobin (Hb A1C)     Orders Placed This Encounter   Medications    Semaglutide (RYBELSUS) 3 MG TABS     Sig: Take 1 tablet by mouth daily     Dispense:  60 tablet     Refill:  0     Lot: [id number]  Exp: 4/2023     There are no discontinued medications. Return in about 3 months (around 3/27/2023). Reviewed with the patient: currentclinical status, medications, activities and diet. Side effects, adverse effects of the medicationprescribed today, as well as treatment plan/ rationale and result expectations havebeen discussed with the patient who expresses understanding and desires to proceed. Pt instructions reviewed and given to patient. Close follow up to evaluate treatment resultsand for coordination of care. I have reviewed the patient's medical historyin detail and updated the computerized patient record.     Eufemia Zavala, APRN - CNP

## 2022-12-27 NOTE — PROGRESS NOTES
Vaccine Information Sheet, \"Influenza - Inactivated\"  given to Eliazar Hewitt, or parent/legal guardian of  Eliazar Hewitt and verbalized understanding. Patient responses:    Have you ever had a reaction to a flu vaccine? No  Are you able to eat eggs without adverse effects? Yes  Do you have any current illness? No  Have you ever had Guillian Yale Syndrome? No    Flu vaccine given per order. Please see immunization tab.

## 2023-01-16 NOTE — PROGRESS NOTES
Subjective:      Patient ID: Evgeny Hernández is a 40 y.o. male who presents today with:  Chief Complaint   Patient presents with    Follow-up     4 mnth f/u        HPI        Here for follow up   No past medical history on file. No past surgical history on file. Social History     Socioeconomic History    Marital status: Unknown     Spouse name: Not on file    Number of children: Not on file    Years of education: Not on file    Highest education level: Not on file   Occupational History    Not on file   Tobacco Use    Smoking status: Former     Packs/day: 0.25     Years: 5.00     Pack years: 1.25     Types: Cigarettes     Quit date: 9/28/2011     Years since quitting: 10.9    Smokeless tobacco: Former     Quit date: 2011   Vaping Use    Vaping Use: Not on file   Substance and Sexual Activity    Alcohol use: Yes     Comment: social     Drug use: Never    Sexual activity: Not on file   Other Topics Concern    Not on file   Social History Narrative    Not on file     Social Determinants of Health     Financial Resource Strain: Low Risk     Difficulty of Paying Living Expenses: Not hard at all   Food Insecurity: No Food Insecurity    Worried About Running Out of Food in the Last Year: Never true    Ran Out of Food in the Last Year: Never true   Transportation Needs: Not on file   Physical Activity: Not on file   Stress: Not on file   Social Connections: Not on file   Intimate Partner Violence: Not on file   Housing Stability: Not on file     No Known Allergies  Current Outpatient Medications on File Prior to Visit   Medication Sig Dispense Refill    metFORMIN (GLUCOPHAGE-XR) 500 MG extended release tablet Take 2 tablets by mouth 2 times daily (with meals) 360 tablet 1    tadalafil (CIALIS) 10 MG tablet Take 1 tablet by mouth daily as needed for Erectile Dysfunction Max 1 tablet per 24 hour period.  30 tablet 5    blood glucose monitor kit and supplies Dispense sufficient amount for indicated testing frequency plus additional to accommodate PRN testing needs. Dispense all needed supplies to include: monitor, strips, lancing device, lancets, control solutions, alcohol swabs. Patient tests once daily 1 kit 0    atorvastatin (LIPITOR) 20 MG tablet Take 1 tablet by mouth daily 90 tablet 3    fenofibrate (TRICOR) 145 MG tablet Take 1 tablet by mouth daily 90 tablet 3    Alcohol Swabs (B-D SINGLE USE SWABS REGULAR) PADS test once daily. 30 each 11    Drug [place] Unilet Lancets 33G MISC test once daily. 30 each 11    TRUE METRIX BLOOD GLUCOSE TEST strip test once daily. 100 each 11    vitamin D 25 MCG (1000 UT) CAPS Take by mouth daily       No current facility-administered medications on file prior to visit. I have personally reviewed the ROS, PMH, PFH, and social history     Review of Systems   Constitutional:  Negative for chills. HENT:  Negative for congestion. Eyes:  Negative for pain. Respiratory:  Negative for shortness of breath. Cardiovascular:  Negative for chest pain. Gastrointestinal:  Negative for abdominal pain. Genitourinary:  Negative for hematuria. Musculoskeletal:  Negative for back pain. Allergic/Immunologic: Negative for immunocompromised state. Neurological:  Negative for headaches. Psychiatric/Behavioral:  Negative for hallucinations. Objective:   /82 (Site: Left Upper Arm, Position: Sitting, Cuff Size: Large Adult)   Pulse 94   Temp 97.4 °F (36.3 °C) (Temporal)   Wt 262 lb (118.8 kg)   SpO2 98%   BMI 35.53 kg/m²     Physical Exam  Constitutional:       General: He is not in acute distress. Appearance: Normal appearance. He is not ill-appearing, toxic-appearing or diaphoretic. HENT:      Head: Normocephalic. Neck:      Vascular: No carotid bruit. Cardiovascular:      Rate and Rhythm: Normal rate and regular rhythm. Pulses: Normal pulses. Heart sounds: Normal heart sounds. No murmur heard. No friction rub. No gallop.    Pulmonary:      Effort: Pulmonary effort is normal. No respiratory distress. Breath sounds: Normal breath sounds. No wheezing, rhonchi or rales. Abdominal:      General: Abdomen is flat. There is no distension. Palpations: Abdomen is soft. Tenderness: There is no abdominal tenderness. There is no right CVA tenderness, left CVA tenderness, guarding or rebound. Musculoskeletal:      Cervical back: Neck supple. Right lower leg: No edema. Left lower leg: No edema. Skin:     General: Skin is warm. Findings: No erythema or rash. Neurological:      Mental Status: He is alert. Psychiatric:         Mood and Affect: Mood normal.     Didn't want foot exam     Assessment:       Diagnosis Orders   1. Uncontrolled type 2 diabetes mellitus with hyperglycemia (HCC)  Dulaglutide 3 MG/0.5ML SOPN    Hemoglobin A1C    Comprehensive Metabolic Panel    Pneumococcal, PCV20, PREVNAR 20, (age 25 yrs+), IM, PF    Continuous Blood Gluc Sensor (FREESTYLE CLAUDY 14 DAY SENSOR) MISC    Continuous Blood Gluc  (FREESTYLE CLAUDY READER) GILBERT      2. Class 2 severe obesity due to excess calories with serious comorbidity and body mass index (BMI) of 35.0 to 35.9 in [facility])  Comprehensive Metabolic Panel      3. Skin lesion  Comprehensive Metabolic Panel    Amb External Referral To Dermatology      4. Low volume of ejaculated semen  External Referral to OB-GYN            Plan:     vc  He will do covid booster this fall. May increase trulicity further to 4.5 mg once a week.    Please see opto this year    fertility clinic             Orders Placed This Encounter   Procedures    Pneumococcal, PCV20, PREVNAR 20, (age 25 yrs+), IM, PF    Hemoglobin A1C     Standing Status:   Future     Standing Expiration Date:   9/8/2023    Comprehensive Metabolic Panel     Standing Status:   Future     Standing Expiration Date:   9/8/2023    External Referral to OB-GYN     Referral Priority:   Routine     Referral Type:   Eval and Treat Referral Reason:   Specialty Services Required     Requested Specialty:   Obstetrics & Gynecology     Number of Visits Requested:   1    Amb External Referral To Dermatology     Referral Priority:   Routine     Referral Type:   Eval and Treat     Referral Reason:   Specialty Services Required     Referred to Provider:   Nasim Vang DO     Requested Specialty:   Dermatology     Number of Visits Requested:   1     Orders Placed This Encounter   Medications    Dulaglutide 3 MG/0.5ML SOPN     Sig: Inject 3 mg into the skin once a week     Dispense:  4 Adjustable Dose Pre-filled Pen Syringe     Refill:  2    Continuous Blood Gluc Sensor (FREESTYLE CLAUDY 14 DAY SENSOR) MISC     Sig: Use as directed every 14 days     Dispense:  6 each     Refill:  3    Continuous Blood Gluc  (FREESTYLE CLAUDY READER) GILBERT     Sig: USE AS DIRECTED     Dispense:  1 each     Refill:  1   He will get flu shot at work  If anything should change or worsen call ASAP, don't wait for next scheduled appointment. No follow-ups on file.       Gwenetta Meckel, MD Yes

## 2023-02-07 NOTE — TELEPHONE ENCOUNTER
Comments: This request is coming from the pharmacy. Last Office Visit (last PCP visit):   Visit date not found    Next Visit Date:  Future Appointments   Date Time Provider Flory Gates   2/21/2023  8:30 AM RAIMUNDO Koroma CNP [facility] AT [place]       If hasn't been seen in over a year OR hasn't followed up according to last diabetes/ADHD visit, make appointment for patient before sending refill to provider.     Rx requested:  Requested Prescriptions     Pending Prescriptions Disp Refills    Dulaglutide 4.5 MG/0.5ML SOPN 12 Adjustable Dose Pre-filled Pen Syringe 0     Sig: Inject 4.5 mg into the skin once a week

## 2023-02-21 ENCOUNTER — OFFICE VISIT (OUTPATIENT)
Dept: FAMILY MEDICINE CLINIC | Age: 38
End: 2023-02-21
Payer: COMMERCIAL

## 2023-02-21 VITALS
TEMPERATURE: 98 F | RESPIRATION RATE: 18 BRPM | WEIGHT: 260 LBS | BODY MASS INDEX: 34.46 KG/M2 | OXYGEN SATURATION: 96 % | HEIGHT: 73 IN | DIASTOLIC BLOOD PRESSURE: 86 MMHG | HEART RATE: 97 BPM | SYSTOLIC BLOOD PRESSURE: 138 MMHG

## 2023-02-21 DIAGNOSIS — E78.1 HYPERTRIGLYCERIDEMIA: ICD-10-CM

## 2023-02-21 DIAGNOSIS — E78.5 HYPERLIPIDEMIA, UNSPECIFIED HYPERLIPIDEMIA TYPE: ICD-10-CM

## 2023-02-21 DIAGNOSIS — E11.65 UNCONTROLLED TYPE 2 DIABETES MELLITUS WITH HYPERGLYCEMIA (HCC): Primary | ICD-10-CM

## 2023-02-21 PROCEDURE — G8427 DOCREV CUR MEDS BY ELIG CLIN: HCPCS | Performed by: NURSE PRACTITIONER

## 2023-02-21 PROCEDURE — 2022F DILAT RTA XM EVC RTNOPTHY: CPT | Performed by: NURSE PRACTITIONER

## 2023-02-21 PROCEDURE — 1036F TOBACCO NON-USER: CPT | Performed by: NURSE PRACTITIONER

## 2023-02-21 PROCEDURE — 3046F HEMOGLOBIN A1C LEVEL >9.0%: CPT | Performed by: NURSE PRACTITIONER

## 2023-02-21 PROCEDURE — G8417 CALC BMI ABV UP PARAM F/U: HCPCS | Performed by: NURSE PRACTITIONER

## 2023-02-21 PROCEDURE — G8482 FLU IMMUNIZE ORDER/ADMIN: HCPCS | Performed by: NURSE PRACTITIONER

## 2023-02-21 PROCEDURE — 99214 OFFICE O/P EST MOD 30 MIN: CPT | Performed by: NURSE PRACTITIONER

## 2023-02-21 RX ORDER — ATORVASTATIN CALCIUM 20 MG/1
20 TABLET, FILM COATED ORAL DAILY
Qty: 90 TABLET | Refills: 3 | Status: SHIPPED | OUTPATIENT
Start: 2023-02-21

## 2023-02-21 RX ORDER — FENOFIBRATE 145 MG/1
145 TABLET, COATED ORAL DAILY
Qty: 90 TABLET | Refills: 3 | Status: SHIPPED | OUTPATIENT
Start: 2023-02-21

## 2023-02-21 RX ORDER — METFORMIN HYDROCHLORIDE 500 MG/1
1000 TABLET, EXTENDED RELEASE ORAL 2 TIMES DAILY WITH MEALS
Qty: 360 TABLET | Refills: 1 | Status: SHIPPED | OUTPATIENT
Start: 2023-02-21

## 2023-02-21 SDOH — ECONOMIC STABILITY: INCOME INSECURITY: HOW HARD IS IT FOR YOU TO PAY FOR THE VERY BASICS LIKE FOOD, HOUSING, MEDICAL CARE, AND HEATING?: NOT HARD AT ALL

## 2023-02-21 SDOH — ECONOMIC STABILITY: FOOD INSECURITY: WITHIN THE PAST 12 MONTHS, THE FOOD YOU BOUGHT JUST DIDN'T LAST AND YOU DIDN'T HAVE MONEY TO GET MORE.: NEVER TRUE

## 2023-02-21 SDOH — ECONOMIC STABILITY: HOUSING INSECURITY
IN THE LAST 12 MONTHS, WAS THERE A TIME WHEN YOU DID NOT HAVE A STEADY PLACE TO SLEEP OR SLEPT IN A SHELTER (INCLUDING NOW)?: NO

## 2023-02-21 SDOH — ECONOMIC STABILITY: FOOD INSECURITY: WITHIN THE PAST 12 MONTHS, YOU WORRIED THAT YOUR FOOD WOULD RUN OUT BEFORE YOU GOT MONEY TO BUY MORE.: NEVER TRUE

## 2023-02-21 ASSESSMENT — PATIENT HEALTH QUESTIONNAIRE - PHQ9
2. FEELING DOWN, DEPRESSED OR HOPELESS: 0
SUM OF ALL RESPONSES TO PHQ QUESTIONS 1-9: 0
SUM OF ALL RESPONSES TO PHQ9 QUESTIONS 1 & 2: 0
SUM OF ALL RESPONSES TO PHQ QUESTIONS 1-9: 0
1. LITTLE INTEREST OR PLEASURE IN DOING THINGS: 0
SUM OF ALL RESPONSES TO PHQ QUESTIONS 1-9: 0
SUM OF ALL RESPONSES TO PHQ QUESTIONS 1-9: 0

## 2023-02-21 ASSESSMENT — ENCOUNTER SYMPTOMS
EYE PAIN: 0
SHORTNESS OF BREATH: 0
BACK PAIN: 0
ABDOMINAL PAIN: 0

## 2023-02-21 NOTE — PROGRESS NOTES
Subjective:     Patient ID: Thomas Hummel is a 40 y.o. male who presentstoday for:  Chief Complaint   Patient presents with    Diabetes     Patient is here for a 6 week f/u on DM. Patient's last A1C was done on 12/27/2022 and was 8.9. patient has been checking his sugars at home and they have been running in the 200's. Diabetes  He presents for his follow-up diabetic visit. He has type 2 diabetes mellitus. There are no hypoglycemic associated symptoms. Pertinent negatives for hypoglycemia include no headaches. There are no diabetic associated symptoms. Pertinent negatives for diabetes include no chest pain, no polydipsia, no polyphagia and no polyuria. Diabetic complications include impotence. Risk factors for coronary artery disease include diabetes mellitus, dyslipidemia, male sex and obesity. Current diabetic treatment includes oral agent (dual therapy) (jsut started trulicity 4 days ago due to insurance approval process). His weight is stable. He monitors blood glucose at home 1-2 x per day. Blood glucose monitoring compliance is fair. His overall blood glucose range is 180-200 mg/dl. No past medical history on file. Current Outpatient Medications on File Prior to Visit   Medication Sig Dispense Refill    Dulaglutide 4.5 MG/0.5ML SOPN Inject 4.5 mg into the skin once a week 12 Adjustable Dose Pre-filled Pen Syringe 0    tadalafil (CIALIS) 10 MG tablet Take 1 tablet by mouth daily as needed for Erectile Dysfunction Max 1 tablet per 24 hour period. 30 tablet 5    Continuous Blood Gluc Sensor (FREESTYLE CLAUDY 14 DAY SENSOR) MISC Use as directed every 14 days 6 each 3    Continuous Blood Gluc  (FREESTYLE CLAUDY READER) GILBERT USE AS DIRECTED 1 each 1    blood glucose monitor kit and supplies Dispense sufficient amount for indicated testing frequency plus additional to accommodate PRN testing needs.  Dispense all needed supplies to include: monitor, strips, lancing device, lancets, control solutions, alcohol swabs. Patient tests once daily 1 kit 0    Alcohol Swabs (B-D SINGLE USE SWABS REGULAR) PADS test once daily. 30 each 11    Drug [place] Unilet Lancets 33G MISC test once daily. 30 each 11    TRUE METRIX BLOOD GLUCOSE TEST strip test once daily. 100 each 11    vitamin D 25 MCG (1000 UT) CAPS Take by mouth daily       No current facility-administered medications on file prior to visit. No past surgical history on file. Family History   Problem Relation Age of Onset    Other Sister         NO FDR or SDR WITH CANCER    Other Maternal Grandfather         no congenital heart disease in family    Other Paternal Grandfather         NO FH OF ANEURYSMS     Social History     Socioeconomic History    Marital status: Unknown     Spouse name: Not on file    Number of children: Not on file    Years of education: Not on file    Highest education level: Not on file   Occupational History    Not on file   Tobacco Use    Smoking status: Former     Packs/day: 0.25     Years: 5.00     Pack years: 1.25     Types: Cigarettes     Quit date: 2011     Years since quittin.4    Smokeless tobacco: Former     Quit date:    Vaping Use    Vaping Use: Not on file   Substance and Sexual Activity    Alcohol use: Yes     Comment: social     Drug use: Never    Sexual activity: Not on file   Other Topics Concern    Not on file   Social History Narrative    Not on file     Social Determinants of Health     Financial Resource Strain: Low Risk     Difficulty of Paying Living Expenses: Not hard at all   Food Insecurity: No Food Insecurity    Worried About Running Out of Food in the Last Year: Never true    [address] in the Last Year: Never true   Transportation Needs: Unknown    Lack of Transportation (Medical): Not on file    Lack of Transportation (Non-Medical):  No   Physical Activity: Not on file   Stress: Not on file   Social Connections: Not on file   Intimate Partner Violence: Not on file   Housing Stability: Unknown    Unable to Pay for Housing in the Last Year: Not on file    Number of Places Lived in the Last Year: Not on file    Unstable Housing in the Last Year: No     Allergies:  Patient has no known allergies. Review of Systems   Constitutional:  Negative for chills and unexpected weight change. HENT:  Negative for congestion. Eyes:  Negative for pain. Respiratory:  Negative for shortness of breath. Cardiovascular:  Negative for chest pain. Gastrointestinal:  Negative for abdominal pain. Endocrine: Negative for polydipsia, polyphagia and polyuria. Genitourinary:  Positive for impotence. Negative for hematuria. Musculoskeletal:  Negative for back pain. Allergic/Immunologic: Negative for immunocompromised state. Neurological:  Negative for headaches. Psychiatric/Behavioral:  Negative for hallucinations. Objective:    /86 (Site: Left Upper Arm, Position: Sitting, Cuff Size: Medium Adult)   Pulse 97   Temp 98 °F (36.7 °C) (Infrared)   Resp 18   Ht 6' 1\" (1.854 m)   Wt 260 lb (117.9 kg)   SpO2 96%   BMI 34.30 kg/m²     Physical Exam  Constitutional:       General: He is not in acute distress. Appearance: He is well-developed. He is not diaphoretic. HENT:      Head: Normocephalic and atraumatic. Right Ear: External ear normal.      Left Ear: External ear normal.      Mouth/Throat:      Mouth: Mucous membranes are moist.   Eyes:      Conjunctiva/sclera: Conjunctivae normal.   Cardiovascular:      Rate and Rhythm: Normal rate and regular rhythm. Heart sounds: Normal heart sounds. Pulmonary:      Effort: Pulmonary effort is normal. No respiratory distress. Breath sounds: Normal breath sounds. No wheezing. Musculoskeletal:         General: Normal range of motion. Cervical back: Normal range of motion and neck supple. No tenderness. Right lower leg: No edema. Left lower leg: No edema.    Lymphadenopathy:      Cervical: No cervical adenopathy. Skin:     General: Skin is warm and dry. Findings: No rash. Neurological:      General: No focal deficit present. Mental Status: He is alert and oriented to person, place, and time. Psychiatric:         Mood and Affect: Mood normal.         Behavior: Behavior normal.       Assessment & Plan:       Diagnosis Orders   1. Uncontrolled type 2 diabetes mellitus with hyperglycemia (HCC)  metFORMIN (GLUCOPHAGE-XR) 500 MG extended release tablet      2. Hyperlipidemia, unspecified hyperlipidemia type  atorvastatin (LIPITOR) 20 MG tablet      3. Hypertriglyceridemia  fenofibrate (TRICOR) 145 MG tablet        No orders of the defined types were placed in this encounter. Orders Placed This Encounter   Medications    metFORMIN (GLUCOPHAGE-XR) 500 MG extended release tablet     Sig: Take 2 tablets by mouth 2 times daily (with meals)     Dispense:  360 tablet     Refill:  1    atorvastatin (LIPITOR) 20 MG tablet     Sig: Take 1 tablet by mouth daily     Dispense:  90 tablet     Refill:  3    fenofibrate (TRICOR) 145 MG tablet     Sig: Take 1 tablet by mouth daily     Dispense:  90 tablet     Refill:  3     Medications Discontinued During This Encounter   Medication Reason    Semaglutide (RYBELSUS) 3 MG TABS LIST CLEANUP    atorvastatin (LIPITOR) 20 MG tablet REORDER    fenofibrate (TRICOR) 145 MG tablet REORDER    metFORMIN (GLUCOPHAGE-XR) 500 MG extended release tablet REORDER     Return in about 6 weeks (around 4/4/2023). Reviewed with the patient: currentclinical status, medications, activities and diet. Side effects, adverse effects of the medicationprescribed today, as well as treatment plan/ rationale and result expectations havebeen discussed with the patient who expresses understanding and desires to proceed. Pt instructions reviewed and given to patient. Close follow up to evaluate treatment resultsand for coordination of care.   I have reviewed the patient's medical historyin detail and updated the computerized patient record.     Elif Knight, RAIMUNDO - CNP

## 2023-03-13 ENCOUNTER — OFFICE VISIT (OUTPATIENT)
Dept: PRIMARY CARE | Facility: CLINIC | Age: 38
End: 2023-03-13
Payer: COMMERCIAL

## 2023-03-13 VITALS
WEIGHT: 260 LBS | DIASTOLIC BLOOD PRESSURE: 85 MMHG | TEMPERATURE: 98.1 F | SYSTOLIC BLOOD PRESSURE: 130 MMHG | HEART RATE: 110 BPM | HEIGHT: 73 IN | BODY MASS INDEX: 34.46 KG/M2 | OXYGEN SATURATION: 96 % | RESPIRATION RATE: 18 BRPM

## 2023-03-13 DIAGNOSIS — N52.03 COMBINED ARTERIAL INSUFFICIENCY AND CORPORO-VENOUS OCCLUSIVE ERECTILE DYSFUNCTION: ICD-10-CM

## 2023-03-13 DIAGNOSIS — I10 PRIMARY HYPERTENSION: ICD-10-CM

## 2023-03-13 DIAGNOSIS — E11.9 TYPE 2 DIABETES MELLITUS WITHOUT COMPLICATION, WITHOUT LONG-TERM CURRENT USE OF INSULIN (MULTI): Primary | ICD-10-CM

## 2023-03-13 DIAGNOSIS — E78.1 HYPERTRIGLYCERIDEMIA: ICD-10-CM

## 2023-03-13 DIAGNOSIS — E34.52 INFERTILE MALE SYNDROME: ICD-10-CM

## 2023-03-13 PROBLEM — N52.9 ERECTILE DYSFUNCTION: Status: ACTIVE | Noted: 2023-03-13

## 2023-03-13 PROBLEM — E11.65 UNCONTROLLED TYPE 2 DIABETES MELLITUS WITH HYPERGLYCEMIA (MULTI): Status: ACTIVE | Noted: 2023-02-21

## 2023-03-13 PROBLEM — E11.65 UNCONTROLLED TYPE 2 DIABETES MELLITUS WITH HYPERGLYCEMIA (MULTI): Status: RESOLVED | Noted: 2023-02-21 | Resolved: 2023-03-13

## 2023-03-13 PROCEDURE — 1036F TOBACCO NON-USER: CPT | Performed by: FAMILY MEDICINE

## 2023-03-13 PROCEDURE — 3079F DIAST BP 80-89 MM HG: CPT | Performed by: FAMILY MEDICINE

## 2023-03-13 PROCEDURE — 3075F SYST BP GE 130 - 139MM HG: CPT | Performed by: FAMILY MEDICINE

## 2023-03-13 PROCEDURE — 4010F ACE/ARB THERAPY RXD/TAKEN: CPT | Performed by: FAMILY MEDICINE

## 2023-03-13 PROCEDURE — 99203 OFFICE O/P NEW LOW 30 MIN: CPT | Performed by: FAMILY MEDICINE

## 2023-03-13 RX ORDER — METFORMIN HYDROCHLORIDE 500 MG/1
500 TABLET, EXTENDED RELEASE ORAL 4 TIMES DAILY
COMMUNITY
Start: 2023-02-21 | End: 2023-03-13 | Stop reason: SDUPTHER

## 2023-03-13 RX ORDER — DULAGLUTIDE 4.5 MG/.5ML
4.5 INJECTION, SOLUTION SUBCUTANEOUS
Qty: 12 PEN | Refills: 3 | Status: SHIPPED | OUTPATIENT
Start: 2023-03-13 | End: 2023-03-25 | Stop reason: ALTCHOICE

## 2023-03-13 RX ORDER — FLASH GLUCOSE SENSOR
KIT MISCELLANEOUS
Qty: 6 EACH | Refills: 3 | Status: SHIPPED | OUTPATIENT
Start: 2023-03-13 | End: 2023-03-25 | Stop reason: ALTCHOICE

## 2023-03-13 RX ORDER — TADALAFIL 10 MG/1
10 TABLET ORAL DAILY
Qty: 90 TABLET | Refills: 3 | Status: SHIPPED | OUTPATIENT
Start: 2023-03-13 | End: 2023-03-25 | Stop reason: SDUPTHER

## 2023-03-13 RX ORDER — CALCIUM CITRATE/VITAMIN D3 200MG-6.25
TABLET ORAL
COMMUNITY
Start: 2022-05-11 | End: 2023-03-25 | Stop reason: ALTCHOICE

## 2023-03-13 RX ORDER — LISINOPRIL 2.5 MG/1
2.5 TABLET ORAL DAILY
Qty: 90 TABLET | Refills: 3 | Status: SHIPPED | OUTPATIENT
Start: 2023-03-13 | End: 2023-03-25 | Stop reason: SDUPTHER

## 2023-03-13 RX ORDER — ATORVASTATIN CALCIUM 20 MG/1
1 TABLET, FILM COATED ORAL DAILY
COMMUNITY
Start: 2023-02-21 | End: 2023-03-13 | Stop reason: ALTCHOICE

## 2023-03-13 RX ORDER — ISOPROPYL ALCOHOL 0.75 G/1
SWAB TOPICAL
COMMUNITY
Start: 2022-05-09 | End: 2023-03-25 | Stop reason: ALTCHOICE

## 2023-03-13 RX ORDER — FENOFIBRATE 145 MG/1
1 TABLET, FILM COATED ORAL DAILY
COMMUNITY
Start: 2023-02-21 | End: 2023-03-13 | Stop reason: ALTCHOICE

## 2023-03-13 RX ORDER — ROSUVASTATIN CALCIUM 10 MG/1
10 TABLET, COATED ORAL DAILY
Qty: 90 TABLET | Refills: 3 | Status: SHIPPED | OUTPATIENT
Start: 2023-03-13 | End: 2023-03-25 | Stop reason: SDUPTHER

## 2023-03-13 RX ORDER — TADALAFIL 10 MG/1
10 TABLET ORAL DAILY PRN
COMMUNITY
Start: 2023-02-28 | End: 2023-03-13 | Stop reason: SDUPTHER

## 2023-03-13 RX ORDER — LANCETS 33 GAUGE
EACH MISCELLANEOUS
COMMUNITY
Start: 2022-05-09 | End: 2023-03-25 | Stop reason: ALTCHOICE

## 2023-03-13 RX ORDER — FLASH GLUCOSE SCANNING READER
EACH MISCELLANEOUS
COMMUNITY
Start: 2022-09-08 | End: 2023-03-25 | Stop reason: ALTCHOICE

## 2023-03-13 RX ORDER — DULAGLUTIDE 4.5 MG/.5ML
4.5 INJECTION, SOLUTION SUBCUTANEOUS
COMMUNITY
Start: 2023-02-15 | End: 2023-03-13 | Stop reason: SDUPTHER

## 2023-03-13 RX ORDER — CHOLECALCIFEROL (VITAMIN D3) 25 MCG
25 TABLET ORAL DAILY
COMMUNITY

## 2023-03-13 RX ORDER — METFORMIN HYDROCHLORIDE 500 MG/1
1000 TABLET, EXTENDED RELEASE ORAL
Qty: 360 TABLET | Refills: 3 | Status: SHIPPED | OUTPATIENT
Start: 2023-03-13 | End: 2023-03-25 | Stop reason: SDUPTHER

## 2023-03-13 ASSESSMENT — ENCOUNTER SYMPTOMS
POLYDIPSIA: 0
FOCAL SENSORY LOSS: 0
FREQUENCY: 0
ABDOMINAL PAIN: 0
SWEATS: 0
BLOOD IN STOOL: 0
NECK PAIN: 0
VISUAL CHANGE: 0
ARTHRALGIAS: 0
NECK STIFFNESS: 0
NUMBNESS: 0
SHORTNESS OF BREATH: 0
WHEEZING: 0
BACK PAIN: 0
WOUND: 0
HEMATURIA: 0
FATIGUE: 0
AGITATION: 0
DYSPHORIC MOOD: 0
DYSURIA: 0
CHOKING: 0
ADENOPATHY: 0
EYE REDNESS: 0
SORE THROAT: 0
CONSTIPATION: 0
MYALGIAS: 0
DIAPHORESIS: 0
TREMORS: 0
FOCAL WEAKNESS: 0
VOICE CHANGE: 0
UNEXPECTED WEIGHT CHANGE: 0
DIARRHEA: 0
COUGH: 0
LEG PAIN: 0
LIGHT-HEADEDNESS: 0
FLANK PAIN: 0
HEADACHES: 0
CONFUSION: 0
NAUSEA: 0
BRUISES/BLEEDS EASILY: 0
BLURRED VISION: 0
WEIGHT LOSS: 0
APPETITE CHANGE: 0
CHEST TIGHTNESS: 0
EYE DISCHARGE: 0
EYE ITCHING: 0
SPEECH DIFFICULTY: 0
NERVOUS/ANXIOUS: 0
PALPITATIONS: 0
WEAKNESS: 0
EYE PAIN: 0
TROUBLE SWALLOWING: 0
DIZZINESS: 0
SLEEP DISTURBANCE: 0
HALLUCINATIONS: 0
PHOTOPHOBIA: 0
CHILLS: 0
POLYPHAGIA: 0
BLACKOUTS: 0
VOMITING: 0
HUNGER: 0
ABDOMINAL DISTENTION: 0
SEIZURES: 0
ACTIVITY CHANGE: 0
JOINT SWELLING: 0
FACIAL ASYMMETRY: 0
FEVER: 0

## 2023-03-13 NOTE — PROGRESS NOTES
Subjective   Patient ID: Markus Perez is a 37 y.o. male who presents for Establish Care (Previous Dr. Evans @ [facility] ) and Referral (Pt. Would like referral to fertility clinic ).    Diabetes  He presents for his follow-up diabetic visit. He has type 2 diabetes mellitus. The initial diagnosis of diabetes was made 2 years ago. His disease course has been improving. Pertinent negatives for hypoglycemia include no confusion, dizziness, headaches, hunger, mood changes, nervousness/anxiousness, pallor, seizures, sleepiness, speech difficulty, sweats or tremors. Pertinent negatives for diabetes include no blurred vision, no chest pain, no fatigue, no foot paresthesias, no foot ulcerations, no polydipsia, no polyphagia, no polyuria, no visual change, no weakness and no weight loss. There are no hypoglycemic complications. Pertinent negatives for hypoglycemia complications include no blackouts, no hospitalization, no nocturnal hypoglycemia, no required assistance and no required glucagon injection. Symptoms are resolved. Diabetic complications include impotence. Pertinent negatives for diabetic complications include no autonomic neuropathy, CVA, heart disease, nephropathy, peripheral neuropathy, PVD or retinopathy. Risk factors for coronary artery disease include diabetes mellitus, dyslipidemia, family history, obesity, male sex and hypertension. Current diabetic treatment includes diet and oral agent (dual therapy). He is compliant with treatment all of the time. His weight is fluctuating minimally. He is following a generally healthy diet. When asked about meal planning, he reported none. He has not had a previous visit with a dietitian. He participates in exercise intermittently. An ACE inhibitor/angiotensin II receptor blocker is not being taken. He does not see a podiatrist.Eye exam is current.   Hyperlipidemia  This is a chronic problem. The current episode started more than 1 year ago. The problem is controlled.  Recent lipid tests were reviewed and are normal. Exacerbating diseases include diabetes and obesity. He has no history of chronic renal disease, hypothyroidism, liver disease or nephrotic syndrome. There are no known factors aggravating his hyperlipidemia. Pertinent negatives include no chest pain, focal sensory loss, focal weakness, leg pain, myalgias or shortness of breath. Current antihyperlipidemic treatment includes fibric acid derivatives and statins. The current treatment provides significant improvement of lipids. There are no compliance problems.  Risk factors for coronary artery disease include diabetes mellitus, dyslipidemia, hypertension, male sex, a sedentary lifestyle, family history and obesity.        Review of Systems   Constitutional:  Negative for activity change, appetite change, chills, diaphoresis, fatigue, fever, unexpected weight change and weight loss.   HENT:  Negative for hearing loss, nosebleeds, postnasal drip, sore throat, trouble swallowing and voice change.    Eyes:  Negative for blurred vision, photophobia, pain, discharge, redness, itching and visual disturbance.   Respiratory:  Negative for cough, choking, chest tightness, shortness of breath and wheezing.    Cardiovascular:  Negative for chest pain, palpitations and leg swelling.   Gastrointestinal:  Negative for abdominal distention, abdominal pain, blood in stool, constipation, diarrhea, nausea and vomiting.   Endocrine: Negative for cold intolerance, heat intolerance, polydipsia, polyphagia and polyuria.   Genitourinary:  Positive for impotence. Negative for dysuria, flank pain, frequency, hematuria and urgency.   Musculoskeletal:  Negative for arthralgias, back pain, joint swelling, myalgias, neck pain and neck stiffness.   Skin:  Negative for pallor, rash and wound.   Allergic/Immunologic: Negative for immunocompromised state.   Neurological:  Negative for dizziness, tremors, focal weakness, seizures, syncope, facial  "asymmetry, speech difficulty, weakness, light-headedness, numbness and headaches.   Hematological:  Negative for adenopathy. Does not bruise/bleed easily.   Psychiatric/Behavioral:  Negative for agitation, behavioral problems, confusion, dysphoric mood, hallucinations, self-injury, sleep disturbance and suicidal ideas. The patient is not nervous/anxious.        Objective   /85 (BP Location: Right arm, Patient Position: Sitting, BP Cuff Size: Large adult)   Pulse 110   Temp 36.7 °C (98.1 °F) (Temporal)   Resp 18   Ht 1.854 m (6' 1\")   Wt 118 kg (260 lb)   SpO2 96%   BMI 34.30 kg/m²     Physical Exam  Constitutional:       General: He is not in acute distress.     Appearance: He is not ill-appearing or diaphoretic.   HENT:      Head: Normocephalic and atraumatic.      Right Ear: External ear normal.      Left Ear: External ear normal.      Nose: Nose normal. No rhinorrhea.   Eyes:      General: Lids are normal. No scleral icterus.        Right eye: No discharge.         Left eye: No discharge.      Conjunctiva/sclera: Conjunctivae normal.   Cardiovascular:      Rate and Rhythm: Normal rate and regular rhythm.      Pulses: Normal pulses.      Heart sounds: No murmur heard.  Pulmonary:      Effort: Pulmonary effort is normal. No respiratory distress.      Breath sounds: No decreased breath sounds, wheezing, rhonchi or rales.   Abdominal:      General: Bowel sounds are normal. There is no distension.      Palpations: Abdomen is soft. There is no mass.      Tenderness: There is no abdominal tenderness. There is no guarding or rebound.   Musculoskeletal:         General: No swelling, tenderness or deformity.      Cervical back: No rigidity or tenderness.      Right lower leg: No edema.      Left lower leg: No edema.   Lymphadenopathy:      Cervical: No cervical adenopathy.      Upper Body:      Right upper body: No supraclavicular adenopathy.      Left upper body: No supraclavicular adenopathy.   Skin:     " General: Skin is warm and dry.      Coloration: Skin is not jaundiced or pale.      Findings: No erythema, lesion or rash.   Neurological:      General: No focal deficit present.      Mental Status: He is alert and oriented to person, place, and time.      Sensory: No sensory deficit.      Motor: No weakness or tremor.      Coordination: Coordination normal.      Gait: Gait normal.   Psychiatric:         Mood and Affect: Mood normal. Affect is not inappropriate.         Behavior: Behavior normal.         Assessment/Plan   Diagnoses and all orders for this visit:  Type 2 diabetes mellitus without complication, without long-term current use of insulin (CMS/[facility])  -     Trulicity 4.5 mg/0.5 mL pen injector; Inject 4.5 mg under the skin 1 (one) time per week.  -     FreeStyle Keith sensor system (FreeStyle [place] 14 Day Sensor) kit; Apply 1 q2wks  -     rosuvastatin (Crestor) 10 mg tablet; Take 1 tablet (10 mg) by mouth once daily.  -     metFORMIN XR (Glucophage-XR) 500 mg 24 hr tablet; Take 2 tablets (1,000 mg) by mouth in the morning and 2 tablets (1,000 mg) in the evening. Take after meals.  -     lisinopril 2.5 mg tablet; Take 1 tablet (2.5 mg) by mouth once daily.  -     Albumin , Urine Random; Future  -     CBC and Auto Differential; Future  -     Comprehensive Metabolic Panel; Future  -     Hemoglobin A1C; Future  -     Lipid Panel; Future  -     Magnesium; Future  -     TSH with reflex to Free T4 if abnormal; Future  -     Follow Up In [facility] - PCP; Future  Combined arterial insufficiency and corporo-venous occlusive erectile dysfunction  -     tadalafil (Cialis) 10 mg tablet; Take 1 tablet (10 mg) by mouth once daily.  -     Comprehensive Metabolic Panel; Future  -     Lipid Panel; Future  -     Follow Up In [facility] - PCP; Future  Hypertriglyceridemia  -     rosuvastatin (Crestor) 10 mg tablet; Take 1 tablet (10 mg) by mouth once daily.  -     Comprehensive Metabolic Panel; Future  -      Lipid Panel; Future  -     TSH with reflex to Free T4 if abnormal; Future  -     Follow Up In [facility] - PCP; Future  Primary hypertension  -     lisinopril 2.5 mg tablet; Take 1 tablet (2.5 mg) by mouth once daily.  -     Albumin , Urine Random; Future  -     CBC and Auto Differential; Future  -     Comprehensive Metabolic Panel; Future  -     Magnesium; Future  -     TSH with reflex to Free T4 if abnormal; Future  -     Follow Up In [facility] - PCP; Future  Infertile male syndrome  -     Referral to Reproductive Endocrinology; Future

## 2023-03-15 ENCOUNTER — TELEPHONE (OUTPATIENT)
Dept: PRIMARY CARE | Facility: CLINIC | Age: 38
End: 2023-03-15
Payer: COMMERCIAL

## 2023-03-18 ENCOUNTER — LAB (OUTPATIENT)
Dept: LAB | Facility: LAB | Age: 38
End: 2023-03-18
Payer: COMMERCIAL

## 2023-03-18 DIAGNOSIS — N52.03 COMBINED ARTERIAL INSUFFICIENCY AND CORPORO-VENOUS OCCLUSIVE ERECTILE DYSFUNCTION: ICD-10-CM

## 2023-03-18 DIAGNOSIS — E11.9 TYPE 2 DIABETES MELLITUS WITHOUT COMPLICATION, WITHOUT LONG-TERM CURRENT USE OF INSULIN (MULTI): ICD-10-CM

## 2023-03-18 DIAGNOSIS — E78.1 HYPERTRIGLYCERIDEMIA: ICD-10-CM

## 2023-03-18 DIAGNOSIS — I10 PRIMARY HYPERTENSION: ICD-10-CM

## 2023-03-18 LAB
ALANINE AMINOTRANSFERASE (SGPT) (U/L) IN SER/PLAS: 65 U/L (ref 10–52)
ALBUMIN (G/DL) IN SER/PLAS: 4.7 G/DL (ref 3.4–5)
ALBUMIN (MG/L) IN URINE: 14.9 MG/L
ALBUMIN/CREATININE (UG/MG) IN URINE: 12.7 UG/MG CRT (ref 0–30)
ALKALINE PHOSPHATASE (U/L) IN SER/PLAS: 82 U/L (ref 33–120)
ANION GAP IN SER/PLAS: 13 MMOL/L (ref 10–20)
ASPARTATE AMINOTRANSFERASE (SGOT) (U/L) IN SER/PLAS: 32 U/L (ref 9–39)
BASOPHILS (10*3/UL) IN BLOOD BY AUTOMATED COUNT: 0.06 X10E9/L (ref 0–0.1)
BASOPHILS/100 LEUKOCYTES IN BLOOD BY AUTOMATED COUNT: 0.7 % (ref 0–2)
BILIRUBIN TOTAL (MG/DL) IN SER/PLAS: 0.6 MG/DL (ref 0–1.2)
CALCIUM (MG/DL) IN SER/PLAS: 9.8 MG/DL (ref 8.6–10.3)
CARBON DIOXIDE, TOTAL (MMOL/L) IN SER/PLAS: 26 MMOL/L (ref 21–32)
CHLORIDE (MMOL/L) IN SER/PLAS: 102 MMOL/L (ref 98–107)
CHOLESTEROL (MG/DL) IN SER/PLAS: 110 MG/DL (ref 0–199)
CHOLESTEROL IN HDL (MG/DL) IN SER/PLAS: 52.6 MG/DL
CHOLESTEROL/HDL RATIO: 2.1
CREATININE (MG/DL) IN SER/PLAS: 0.96 MG/DL (ref 0.5–1.3)
CREATININE (MG/DL) IN URINE: 117 MG/DL (ref 20–370)
EOSINOPHILS (10*3/UL) IN BLOOD BY AUTOMATED COUNT: 0.32 X10E9/L (ref 0–0.7)
EOSINOPHILS/100 LEUKOCYTES IN BLOOD BY AUTOMATED COUNT: 3.7 % (ref 0–6)
ERYTHROCYTE DISTRIBUTION WIDTH (RATIO) BY AUTOMATED COUNT: 11.9 % (ref 11.5–14.5)
ERYTHROCYTE MEAN CORPUSCULAR HEMOGLOBIN CONCENTRATION (G/DL) BY AUTOMATED: 33.3 G/DL (ref 32–36)
ERYTHROCYTE MEAN CORPUSCULAR VOLUME (FL) BY AUTOMATED COUNT: 94 FL (ref 80–100)
ERYTHROCYTES (10*6/UL) IN BLOOD BY AUTOMATED COUNT: 5.06 X10E12/L (ref 4.5–5.9)
GFR MALE: >90 ML/MIN/1.73M2
GLUCOSE (MG/DL) IN SER/PLAS: 187 MG/DL (ref 74–99)
HEMATOCRIT (%) IN BLOOD BY AUTOMATED COUNT: 47.5 % (ref 41–52)
HEMOGLOBIN (G/DL) IN BLOOD: 15.8 G/DL (ref 13.5–17.5)
IMMATURE GRANULOCYTES/100 LEUKOCYTES IN BLOOD BY AUTOMATED COUNT: 0.3 % (ref 0–0.9)
LDL: 41 MG/DL (ref 0–99)
LEUKOCYTES (10*3/UL) IN BLOOD BY AUTOMATED COUNT: 8.6 X10E9/L (ref 4.4–11.3)
LYMPHOCYTES (10*3/UL) IN BLOOD BY AUTOMATED COUNT: 2.31 X10E9/L (ref 1.2–4.8)
LYMPHOCYTES/100 LEUKOCYTES IN BLOOD BY AUTOMATED COUNT: 26.9 % (ref 13–44)
MAGNESIUM (MG/DL) IN SER/PLAS: 1.9 MG/DL (ref 1.6–2.4)
MONOCYTES (10*3/UL) IN BLOOD BY AUTOMATED COUNT: 0.61 X10E9/L (ref 0.1–1)
MONOCYTES/100 LEUKOCYTES IN BLOOD BY AUTOMATED COUNT: 7.1 % (ref 2–10)
NEUTROPHILS (10*3/UL) IN BLOOD BY AUTOMATED COUNT: 5.25 X10E9/L (ref 1.2–7.7)
NEUTROPHILS/100 LEUKOCYTES IN BLOOD BY AUTOMATED COUNT: 61.3 % (ref 40–80)
PLATELETS (10*3/UL) IN BLOOD AUTOMATED COUNT: 254 X10E9/L (ref 150–450)
POTASSIUM (MMOL/L) IN SER/PLAS: 4 MMOL/L (ref 3.5–5.3)
PROTEIN TOTAL: 7.4 G/DL (ref 6.4–8.2)
SODIUM (MMOL/L) IN SER/PLAS: 137 MMOL/L (ref 136–145)
THYROTROPIN (MIU/L) IN SER/PLAS BY DETECTION LIMIT <= 0.05 MIU/L: 2.1 MIU/L (ref 0.44–3.98)
TRIGLYCERIDE (MG/DL) IN SER/PLAS: 80 MG/DL (ref 0–149)
UREA NITROGEN (MG/DL) IN SER/PLAS: 12 MG/DL (ref 6–23)
VLDL: 16 MG/DL (ref 0–40)

## 2023-03-18 PROCEDURE — 36415 COLL VENOUS BLD VENIPUNCTURE: CPT

## 2023-03-18 PROCEDURE — 84443 ASSAY THYROID STIM HORMONE: CPT

## 2023-03-18 PROCEDURE — 82570 ASSAY OF URINE CREATININE: CPT

## 2023-03-18 PROCEDURE — 82043 UR ALBUMIN QUANTITATIVE: CPT

## 2023-03-18 PROCEDURE — 80061 LIPID PANEL: CPT

## 2023-03-18 PROCEDURE — 83735 ASSAY OF MAGNESIUM: CPT

## 2023-03-18 PROCEDURE — 85025 COMPLETE CBC W/AUTO DIFF WBC: CPT

## 2023-03-18 PROCEDURE — 83036 HEMOGLOBIN GLYCOSYLATED A1C: CPT

## 2023-03-18 PROCEDURE — 80053 COMPREHEN METABOLIC PANEL: CPT

## 2023-03-19 LAB
ESTIMATED AVERAGE GLUCOSE FOR HBA1C: 214 MG/DL
HEMOGLOBIN A1C/HEMOGLOBIN TOTAL IN BLOOD: 9.1 %

## 2023-03-25 ENCOUNTER — OFFICE VISIT (OUTPATIENT)
Dept: PRIMARY CARE | Facility: CLINIC | Age: 38
End: 2023-03-25
Payer: COMMERCIAL

## 2023-03-25 VITALS
BODY MASS INDEX: 34.99 KG/M2 | WEIGHT: 264 LBS | HEIGHT: 73 IN | SYSTOLIC BLOOD PRESSURE: 130 MMHG | HEART RATE: 90 BPM | DIASTOLIC BLOOD PRESSURE: 84 MMHG | RESPIRATION RATE: 18 BRPM | OXYGEN SATURATION: 95 % | TEMPERATURE: 97.5 F

## 2023-03-25 DIAGNOSIS — E11.9 TYPE 2 DIABETES MELLITUS WITHOUT COMPLICATION, WITHOUT LONG-TERM CURRENT USE OF INSULIN (MULTI): Primary | ICD-10-CM

## 2023-03-25 DIAGNOSIS — I10 PRIMARY HYPERTENSION: ICD-10-CM

## 2023-03-25 DIAGNOSIS — E66.09 CLASS 1 OBESITY DUE TO EXCESS CALORIES WITH SERIOUS COMORBIDITY AND BODY MASS INDEX (BMI) OF 34.0 TO 34.9 IN ADULT: ICD-10-CM

## 2023-03-25 DIAGNOSIS — E78.1 HYPERTRIGLYCERIDEMIA: ICD-10-CM

## 2023-03-25 DIAGNOSIS — N52.03 COMBINED ARTERIAL INSUFFICIENCY AND CORPORO-VENOUS OCCLUSIVE ERECTILE DYSFUNCTION: ICD-10-CM

## 2023-03-25 PROBLEM — E66.811 CLASS 1 OBESITY DUE TO EXCESS CALORIES WITH SERIOUS COMORBIDITY AND BODY MASS INDEX (BMI) OF 34.0 TO 34.9 IN ADULT: Status: ACTIVE | Noted: 2023-03-25

## 2023-03-25 PROCEDURE — 4010F ACE/ARB THERAPY RXD/TAKEN: CPT | Performed by: FAMILY MEDICINE

## 2023-03-25 PROCEDURE — 1036F TOBACCO NON-USER: CPT | Performed by: FAMILY MEDICINE

## 2023-03-25 PROCEDURE — 3079F DIAST BP 80-89 MM HG: CPT | Performed by: FAMILY MEDICINE

## 2023-03-25 PROCEDURE — 3008F BODY MASS INDEX DOCD: CPT | Performed by: FAMILY MEDICINE

## 2023-03-25 PROCEDURE — 3075F SYST BP GE 130 - 139MM HG: CPT | Performed by: FAMILY MEDICINE

## 2023-03-25 PROCEDURE — 99214 OFFICE O/P EST MOD 30 MIN: CPT | Performed by: FAMILY MEDICINE

## 2023-03-25 PROCEDURE — 3046F HEMOGLOBIN A1C LEVEL >9.0%: CPT | Performed by: FAMILY MEDICINE

## 2023-03-25 RX ORDER — TADALAFIL 10 MG/1
10 TABLET ORAL DAILY PRN
Qty: 90 TABLET | Refills: 3 | Status: SHIPPED | OUTPATIENT
Start: 2023-03-25 | End: 2023-06-28 | Stop reason: SDUPTHER

## 2023-03-25 RX ORDER — LISINOPRIL 2.5 MG/1
2.5 TABLET ORAL DAILY
Qty: 90 TABLET | Refills: 3 | Status: SHIPPED | OUTPATIENT
Start: 2023-03-25 | End: 2023-06-28 | Stop reason: SINTOL

## 2023-03-25 RX ORDER — TIRZEPATIDE 5 MG/.5ML
5 INJECTION, SOLUTION SUBCUTANEOUS
Qty: 2 ML | Refills: 11 | Status: SHIPPED | OUTPATIENT
Start: 2023-03-25 | End: 2023-06-28 | Stop reason: DRUGHIGH

## 2023-03-25 RX ORDER — ROSUVASTATIN CALCIUM 10 MG/1
10 TABLET, COATED ORAL DAILY
Qty: 90 TABLET | Refills: 3 | Status: SHIPPED | OUTPATIENT
Start: 2023-03-25 | End: 2023-12-12 | Stop reason: SDUPTHER

## 2023-03-25 RX ORDER — METFORMIN HYDROCHLORIDE 500 MG/1
1000 TABLET, EXTENDED RELEASE ORAL
Qty: 360 TABLET | Refills: 3 | Status: SHIPPED | OUTPATIENT
Start: 2023-03-25 | End: 2024-03-18

## 2023-03-25 ASSESSMENT — ENCOUNTER SYMPTOMS
APPETITE CHANGE: 0
DIAPHORESIS: 0
WEIGHT LOSS: 0
BACK PAIN: 0
VOMITING: 0
BLURRED VISION: 0
DYSURIA: 0
EYE ITCHING: 0
POLYPHAGIA: 0
SORE THROAT: 0
JOINT SWELLING: 0
LIGHT-HEADEDNESS: 0
WEAKNESS: 0
SLEEP DISTURBANCE: 0
DIARRHEA: 0
NAUSEA: 0
PHOTOPHOBIA: 0
AGITATION: 0
BLOOD IN STOOL: 0
FATIGUE: 0
CHOKING: 0
FEVER: 0
CONSTIPATION: 0
NUMBNESS: 0
NECK PAIN: 0
NECK STIFFNESS: 0
EYE REDNESS: 0
WHEEZING: 0
FREQUENCY: 0
ADENOPATHY: 0
DYSPHORIC MOOD: 0
FACIAL ASYMMETRY: 0
EYE PAIN: 0
FLANK PAIN: 0
WOUND: 0
ARTHRALGIAS: 0
CHILLS: 0
PALPITATIONS: 0
EYE DISCHARGE: 0
POLYDIPSIA: 0
BRUISES/BLEEDS EASILY: 0
VISUAL CHANGE: 0
ACTIVITY CHANGE: 0
VOICE CHANGE: 0
ABDOMINAL DISTENTION: 0
CHEST TIGHTNESS: 0
COUGH: 0
ABDOMINAL PAIN: 0
HEMATURIA: 0
UNEXPECTED WEIGHT CHANGE: 0
HALLUCINATIONS: 0
HYPERTENSION: 1
TROUBLE SWALLOWING: 0

## 2023-03-25 NOTE — PROGRESS NOTES
Subjective   Patient ID: Markus Perez is a 37 y.o. male who presents for 1 week follow up (Lab results).    Diabetes  He presents for his follow-up diabetic visit. He has type 2 diabetes mellitus. The initial diagnosis of diabetes was made 2 years ago. His disease course has been stable. There are no hypoglycemic associated symptoms. Pertinent negatives for diabetes include no blurred vision, no fatigue, no foot paresthesias, no foot ulcerations, no polydipsia, no polyphagia, no polyuria, no visual change, no weakness and no weight loss. There are no hypoglycemic complications. Symptoms are resolved. Diabetic complications include impotence. Risk factors for coronary artery disease include diabetes mellitus, dyslipidemia, family history, obesity, male sex and hypertension. Current diabetic treatment includes diet and oral agent (dual therapy). He is compliant with treatment all of the time. His weight is stable. He is following a generally healthy diet. When asked about meal planning, he reported none. He has not had a previous visit with a dietitian. He participates in exercise intermittently. An ACE inhibitor/angiotensin II receptor blocker is being taken. He does not see a podiatrist.Eye exam is current.   Hyperlipidemia  This is a chronic problem. The current episode started more than 1 year ago. The problem is controlled. Recent lipid tests were reviewed and are normal. Exacerbating diseases include diabetes and obesity. There are no known factors aggravating his hyperlipidemia. Current antihyperlipidemic treatment includes statins. The current treatment provides significant improvement of lipids. There are no compliance problems.  Risk factors for coronary artery disease include diabetes mellitus, dyslipidemia, hypertension, male sex, a sedentary lifestyle, family history and obesity.   Hypertension  This is a chronic problem. The problem is unchanged. The problem is controlled. Pertinent negatives include  "no blurred vision, neck pain or palpitations. There are no associated agents to hypertension. Past treatments include ACE inhibitors. The current treatment provides significant improvement. There are no compliance problems.         Review of Systems   Constitutional:  Negative for activity change, appetite change, chills, diaphoresis, fatigue, fever, unexpected weight change and weight loss.   HENT:  Negative for hearing loss, nosebleeds, postnasal drip, sore throat, trouble swallowing and voice change.    Eyes:  Negative for blurred vision, photophobia, pain, discharge, redness, itching and visual disturbance.   Respiratory:  Negative for cough, choking, chest tightness and wheezing.    Cardiovascular:  Negative for palpitations and leg swelling.   Gastrointestinal:  Negative for abdominal distention, abdominal pain, blood in stool, constipation, diarrhea, nausea and vomiting.   Endocrine: Negative for cold intolerance, heat intolerance, polydipsia, polyphagia and polyuria.   Genitourinary:  Positive for impotence. Negative for dysuria, flank pain, frequency, hematuria and urgency.   Musculoskeletal:  Negative for arthralgias, back pain, joint swelling, neck pain and neck stiffness.   Skin:  Negative for rash and wound.   Allergic/Immunologic: Negative for immunocompromised state.   Neurological:  Negative for syncope, facial asymmetry, weakness, light-headedness and numbness.   Hematological:  Negative for adenopathy. Does not bruise/bleed easily.   Psychiatric/Behavioral:  Negative for agitation, behavioral problems, dysphoric mood, hallucinations, self-injury, sleep disturbance and suicidal ideas.        Objective   /84 (BP Location: Right arm, Patient Position: Sitting, BP Cuff Size: Large adult)   Pulse 90   Temp 36.4 °C (97.5 °F) (Temporal)   Resp 18   Ht 1.854 m (6' 1\")   Wt 120 kg (264 lb)   SpO2 95%   BMI 34.83 kg/m²     Physical Exam  Constitutional:       General: He is not in acute " distress.     Appearance: He is not ill-appearing or diaphoretic.   HENT:      Head: Normocephalic and atraumatic.      Right Ear: External ear normal.      Left Ear: External ear normal.      Nose: Nose normal. No rhinorrhea.   Eyes:      General: Lids are normal. No scleral icterus.        Right eye: No discharge.         Left eye: No discharge.      Conjunctiva/sclera: Conjunctivae normal.   Cardiovascular:      Rate and Rhythm: Normal rate and regular rhythm.      Pulses: Normal pulses.      Heart sounds: No murmur heard.  Pulmonary:      Effort: Pulmonary effort is normal. No respiratory distress.      Breath sounds: No decreased breath sounds, wheezing, rhonchi or rales.   Abdominal:      General: Bowel sounds are normal. There is no distension.      Palpations: Abdomen is soft. There is no mass.      Tenderness: There is no abdominal tenderness. There is no guarding or rebound.   Musculoskeletal:         General: No swelling, tenderness or deformity.      Cervical back: No rigidity or tenderness.      Right lower leg: No edema.      Left lower leg: No edema.   Lymphadenopathy:      Cervical: No cervical adenopathy.      Upper Body:      Right upper body: No supraclavicular adenopathy.      Left upper body: No supraclavicular adenopathy.   Skin:     General: Skin is warm and dry.      Coloration: Skin is not jaundiced or pale.      Findings: No erythema, lesion or rash.   Neurological:      General: No focal deficit present.      Mental Status: He is alert and oriented to person, place, and time.      Sensory: No sensory deficit.      Motor: No weakness or tremor.      Coordination: Coordination normal.      Gait: Gait normal.   Psychiatric:         Mood and Affect: Mood normal. Affect is not inappropriate.         Behavior: Behavior normal.         Assessment/Plan   Diagnoses and all orders for this visit:  Type 2 diabetes mellitus without complication, without long-term current use of insulin (CMS/[facility], located [facility])  -      Follow Up In [facility] - PCP  -     rosuvastatin (Crestor) 10 mg tablet; Take 1 tablet (10 mg) by mouth once daily.  -     tirzepatide (Mounjaro) 5 mg/0.5 mL pen injector; Inject 5 mg under the skin 1 (one) time per week.  -     Follow Up In [facility] - PCP; Future  -     lisinopril 2.5 mg tablet; Take 1 tablet (2.5 mg) by mouth once daily.  -     metFORMIN XR (Glucophage-XR) 500 mg 24 hr tablet; Take 2 tablets (1,000 mg) by mouth in the morning and 2 tablets (1,000 mg) in the evening. Take after meals.  -     Albumin , Urine Random; Future  -     CBC and Auto Differential; Future  -     Comprehensive Metabolic Panel; Future  -     Hemoglobin A1C; Future  -     Lipid Panel; Future  -     Magnesium; Future  -     TSH with reflex to Free T4 if abnormal; Future  Primary hypertension  -     Follow Up In [facility] - PCP  -     Follow Up In [facility] - PCP; Future  -     lisinopril 2.5 mg tablet; Take 1 tablet (2.5 mg) by mouth once daily.  -     Albumin , Urine Random; Future  -     CBC and Auto Differential; Future  -     Comprehensive Metabolic Panel; Future  -     Magnesium; Future  -     TSH with reflex to Free T4 if abnormal; Future  Class 1 obesity due to excess calories with serious comorbidity and body mass index (BMI) of 34.0 to 34.9 in adult  -     tirzepatide (Mounjaro) 5 mg/0.5 mL pen injector; Inject 5 mg under the skin 1 (one) time per week.  -     Follow Up In [facility] - PCP; Future  -     Comprehensive Metabolic Panel; Future  -     Lipid Panel; Future  -     TSH with reflex to Free T4 if abnormal; Future  Hypertriglyceridemia  -     Follow Up In [facility] - PCP  -     rosuvastatin (Crestor) 10 mg tablet; Take 1 tablet (10 mg) by mouth once daily.  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

## 2023-06-24 ENCOUNTER — LAB (OUTPATIENT)
Dept: LAB | Facility: LAB | Age: 38
End: 2023-06-24
Payer: COMMERCIAL

## 2023-06-24 DIAGNOSIS — E11.9 TYPE 2 DIABETES MELLITUS WITHOUT COMPLICATION, WITHOUT LONG-TERM CURRENT USE OF INSULIN (MULTI): ICD-10-CM

## 2023-06-24 DIAGNOSIS — E78.1 HYPERTRIGLYCERIDEMIA: ICD-10-CM

## 2023-06-24 DIAGNOSIS — E66.09 CLASS 1 OBESITY DUE TO EXCESS CALORIES WITH SERIOUS COMORBIDITY AND BODY MASS INDEX (BMI) OF 34.0 TO 34.9 IN ADULT: ICD-10-CM

## 2023-06-24 DIAGNOSIS — I10 PRIMARY HYPERTENSION: ICD-10-CM

## 2023-06-24 LAB
ALANINE AMINOTRANSFERASE (SGPT) (U/L) IN SER/PLAS: 44 U/L (ref 10–52)
ALBUMIN (G/DL) IN SER/PLAS: 4.5 G/DL (ref 3.4–5)
ALBUMIN (MG/L) IN URINE: 43.4 MG/L
ALBUMIN/CREATININE (UG/MG) IN URINE: 35.3 UG/MG CRT (ref 0–30)
ALKALINE PHOSPHATASE (U/L) IN SER/PLAS: 73 U/L (ref 33–120)
ANION GAP IN SER/PLAS: 16 MMOL/L (ref 10–20)
ASPARTATE AMINOTRANSFERASE (SGOT) (U/L) IN SER/PLAS: 26 U/L (ref 9–39)
BASOPHILS (10*3/UL) IN BLOOD BY AUTOMATED COUNT: 0.04 X10E9/L (ref 0–0.1)
BASOPHILS/100 LEUKOCYTES IN BLOOD BY AUTOMATED COUNT: 0.5 % (ref 0–2)
BILIRUBIN TOTAL (MG/DL) IN SER/PLAS: 0.3 MG/DL (ref 0–1.2)
CALCIUM (MG/DL) IN SER/PLAS: 9 MG/DL (ref 8.6–10.3)
CARBON DIOXIDE, TOTAL (MMOL/L) IN SER/PLAS: 21 MMOL/L (ref 21–32)
CHLORIDE (MMOL/L) IN SER/PLAS: 105 MMOL/L (ref 98–107)
CHOLESTEROL (MG/DL) IN SER/PLAS: 98 MG/DL (ref 0–199)
CHOLESTEROL IN HDL (MG/DL) IN SER/PLAS: 42.4 MG/DL
CHOLESTEROL/HDL RATIO: 2.3
CREATININE (MG/DL) IN SER/PLAS: 0.83 MG/DL (ref 0.5–1.3)
CREATININE (MG/DL) IN URINE: 123 MG/DL (ref 20–370)
EOSINOPHILS (10*3/UL) IN BLOOD BY AUTOMATED COUNT: 0.21 X10E9/L (ref 0–0.7)
EOSINOPHILS/100 LEUKOCYTES IN BLOOD BY AUTOMATED COUNT: 2.6 % (ref 0–6)
ERYTHROCYTE DISTRIBUTION WIDTH (RATIO) BY AUTOMATED COUNT: 12.4 % (ref 11.5–14.5)
ERYTHROCYTE MEAN CORPUSCULAR HEMOGLOBIN CONCENTRATION (G/DL) BY AUTOMATED: 33.4 G/DL (ref 32–36)
ERYTHROCYTE MEAN CORPUSCULAR VOLUME (FL) BY AUTOMATED COUNT: 93 FL (ref 80–100)
ERYTHROCYTES (10*6/UL) IN BLOOD BY AUTOMATED COUNT: 4.55 X10E12/L (ref 4.5–5.9)
ESTIMATED AVERAGE GLUCOSE FOR HBA1C: 151 MG/DL
GFR MALE: >90 ML/MIN/1.73M2
GLUCOSE (MG/DL) IN SER/PLAS: 168 MG/DL (ref 74–99)
HEMATOCRIT (%) IN BLOOD BY AUTOMATED COUNT: 42.2 % (ref 41–52)
HEMOGLOBIN (G/DL) IN BLOOD: 14.1 G/DL (ref 13.5–17.5)
HEMOGLOBIN A1C/HEMOGLOBIN TOTAL IN BLOOD: 6.9 %
IMMATURE GRANULOCYTES/100 LEUKOCYTES IN BLOOD BY AUTOMATED COUNT: 0.6 % (ref 0–0.9)
LDL: 9 MG/DL (ref 0–99)
LEUKOCYTES (10*3/UL) IN BLOOD BY AUTOMATED COUNT: 8.1 X10E9/L (ref 4.4–11.3)
LYMPHOCYTES (10*3/UL) IN BLOOD BY AUTOMATED COUNT: 2.26 X10E9/L (ref 1.2–4.8)
LYMPHOCYTES/100 LEUKOCYTES IN BLOOD BY AUTOMATED COUNT: 28 % (ref 13–44)
MAGNESIUM (MG/DL) IN SER/PLAS: 1.83 MG/DL (ref 1.6–2.4)
MONOCYTES (10*3/UL) IN BLOOD BY AUTOMATED COUNT: 0.58 X10E9/L (ref 0.1–1)
MONOCYTES/100 LEUKOCYTES IN BLOOD BY AUTOMATED COUNT: 7.2 % (ref 2–10)
NEUTROPHILS (10*3/UL) IN BLOOD BY AUTOMATED COUNT: 4.93 X10E9/L (ref 1.2–7.7)
NEUTROPHILS/100 LEUKOCYTES IN BLOOD BY AUTOMATED COUNT: 61.1 % (ref 40–80)
NON HDL CHOLESTEROL: 56 MG/DL
PLATELETS (10*3/UL) IN BLOOD AUTOMATED COUNT: 243 X10E9/L (ref 150–450)
POTASSIUM (MMOL/L) IN SER/PLAS: 4 MMOL/L (ref 3.5–5.3)
PROTEIN TOTAL: 7.4 G/DL (ref 6.4–8.2)
SODIUM (MMOL/L) IN SER/PLAS: 138 MMOL/L (ref 136–145)
THYROTROPIN (MIU/L) IN SER/PLAS BY DETECTION LIMIT <= 0.05 MIU/L: 1.28 MIU/L (ref 0.44–3.98)
TRIGLYCERIDE (MG/DL) IN SER/PLAS: 231 MG/DL (ref 0–149)
UREA NITROGEN (MG/DL) IN SER/PLAS: 8 MG/DL (ref 6–23)
VLDL: 46 MG/DL (ref 0–40)

## 2023-06-24 PROCEDURE — 85025 COMPLETE CBC W/AUTO DIFF WBC: CPT

## 2023-06-24 PROCEDURE — 80053 COMPREHEN METABOLIC PANEL: CPT

## 2023-06-24 PROCEDURE — 82570 ASSAY OF URINE CREATININE: CPT

## 2023-06-24 PROCEDURE — 82043 UR ALBUMIN QUANTITATIVE: CPT

## 2023-06-24 PROCEDURE — 83036 HEMOGLOBIN GLYCOSYLATED A1C: CPT

## 2023-06-24 PROCEDURE — 80061 LIPID PANEL: CPT

## 2023-06-24 PROCEDURE — 36415 COLL VENOUS BLD VENIPUNCTURE: CPT

## 2023-06-24 PROCEDURE — 83735 ASSAY OF MAGNESIUM: CPT

## 2023-06-24 PROCEDURE — 84443 ASSAY THYROID STIM HORMONE: CPT

## 2023-06-28 ENCOUNTER — OFFICE VISIT (OUTPATIENT)
Dept: PRIMARY CARE | Facility: CLINIC | Age: 38
End: 2023-06-28
Payer: COMMERCIAL

## 2023-06-28 ENCOUNTER — TELEPHONE (OUTPATIENT)
Dept: PRIMARY CARE | Facility: CLINIC | Age: 38
End: 2023-06-28

## 2023-06-28 VITALS
HEART RATE: 100 BPM | OXYGEN SATURATION: 94 % | DIASTOLIC BLOOD PRESSURE: 88 MMHG | WEIGHT: 255 LBS | SYSTOLIC BLOOD PRESSURE: 129 MMHG | BODY MASS INDEX: 33.8 KG/M2 | TEMPERATURE: 97.5 F | RESPIRATION RATE: 18 BRPM | HEIGHT: 73 IN

## 2023-06-28 DIAGNOSIS — N52.03 COMBINED ARTERIAL INSUFFICIENCY AND CORPORO-VENOUS OCCLUSIVE ERECTILE DYSFUNCTION: ICD-10-CM

## 2023-06-28 DIAGNOSIS — K05.10 GINGIVITIS: ICD-10-CM

## 2023-06-28 DIAGNOSIS — E78.1 HYPERTRIGLYCERIDEMIA: ICD-10-CM

## 2023-06-28 DIAGNOSIS — E34.52 INFERTILE MALE SYNDROME: ICD-10-CM

## 2023-06-28 DIAGNOSIS — E66.09 CLASS 1 OBESITY DUE TO EXCESS CALORIES WITH SERIOUS COMORBIDITY AND BODY MASS INDEX (BMI) OF 33.0 TO 33.9 IN ADULT: ICD-10-CM

## 2023-06-28 DIAGNOSIS — I10 PRIMARY HYPERTENSION: ICD-10-CM

## 2023-06-28 DIAGNOSIS — E11.9 TYPE 2 DIABETES MELLITUS WITHOUT COMPLICATION, WITHOUT LONG-TERM CURRENT USE OF INSULIN (MULTI): Primary | ICD-10-CM

## 2023-06-28 PROCEDURE — 1036F TOBACCO NON-USER: CPT | Performed by: FAMILY MEDICINE

## 2023-06-28 PROCEDURE — 3044F HG A1C LEVEL LT 7.0%: CPT | Performed by: FAMILY MEDICINE

## 2023-06-28 PROCEDURE — 3074F SYST BP LT 130 MM HG: CPT | Performed by: FAMILY MEDICINE

## 2023-06-28 PROCEDURE — 4010F ACE/ARB THERAPY RXD/TAKEN: CPT | Performed by: FAMILY MEDICINE

## 2023-06-28 PROCEDURE — 3008F BODY MASS INDEX DOCD: CPT | Performed by: FAMILY MEDICINE

## 2023-06-28 PROCEDURE — 99214 OFFICE O/P EST MOD 30 MIN: CPT | Performed by: FAMILY MEDICINE

## 2023-06-28 PROCEDURE — 3079F DIAST BP 80-89 MM HG: CPT | Performed by: FAMILY MEDICINE

## 2023-06-28 RX ORDER — TIRZEPATIDE 7.5 MG/.5ML
7.5 INJECTION, SOLUTION SUBCUTANEOUS
Qty: 6 ML | Refills: 3 | Status: SHIPPED | OUTPATIENT
Start: 2023-06-28

## 2023-06-28 RX ORDER — TADALAFIL 5 MG/1
5 TABLET ORAL DAILY
Qty: 90 TABLET | Refills: 3 | Status: SHIPPED | OUTPATIENT
Start: 2023-06-28 | End: 2024-06-27

## 2023-06-28 RX ORDER — CHLORHEXIDINE GLUCONATE ORAL RINSE 1.2 MG/ML
15 SOLUTION DENTAL 2 TIMES DAILY
Qty: 473 ML | Refills: 5 | Status: SHIPPED | OUTPATIENT
Start: 2023-06-28 | End: 2023-10-01

## 2023-06-28 RX ORDER — LOSARTAN POTASSIUM 25 MG/1
25 TABLET ORAL DAILY
Qty: 90 TABLET | Refills: 3 | Status: SHIPPED | OUTPATIENT
Start: 2023-06-28 | End: 2023-12-12 | Stop reason: SDUPTHER

## 2023-06-28 ASSESSMENT — ENCOUNTER SYMPTOMS
PALPITATIONS: 0
FACIAL ASYMMETRY: 0
CHILLS: 0
ADENOPATHY: 0
DYSURIA: 0
DIAPHORESIS: 0
BLOOD IN STOOL: 0
FEVER: 0
FREQUENCY: 0
NAUSEA: 0
POLYDIPSIA: 0
NECK STIFFNESS: 0
BACK PAIN: 0
BRUISES/BLEEDS EASILY: 0
FATIGUE: 0
EYE REDNESS: 0
NUMBNESS: 0
CHOKING: 0
LIGHT-HEADEDNESS: 0
SLEEP DISTURBANCE: 0
AGITATION: 0
EYE DISCHARGE: 0
EYE PAIN: 0
HALLUCINATIONS: 0
VOMITING: 0
HEMATURIA: 0
VOICE CHANGE: 0
JOINT SWELLING: 0
DYSPHORIC MOOD: 0
CHEST TIGHTNESS: 0
HYPERTENSION: 1
ABDOMINAL PAIN: 0
ACTIVITY CHANGE: 0
PHOTOPHOBIA: 0
SORE THROAT: 0
COUGH: 0
UNEXPECTED WEIGHT CHANGE: 0
WHEEZING: 0
CONSTIPATION: 0
DIARRHEA: 0
TROUBLE SWALLOWING: 0
POLYPHAGIA: 0
BLURRED VISION: 0
APPETITE CHANGE: 0
WEIGHT LOSS: 0
WEAKNESS: 0
NECK PAIN: 0
ABDOMINAL DISTENTION: 0
VISUAL CHANGE: 0
ARTHRALGIAS: 0
WOUND: 0
FLANK PAIN: 0
EYE ITCHING: 0

## 2023-06-28 NOTE — PROGRESS NOTES
Subjective   Patient ID: Markus Perez is a 38 y.o. male who presents for 3 month check up (And follow up labs ) and Cough (X cough weeks, dry).    Diabetes  He presents for his follow-up diabetic visit. He has type 2 diabetes mellitus. His disease course has been improving. There are no hypoglycemic associated symptoms. Pertinent negatives for diabetes include no blurred vision, no fatigue, no foot paresthesias, no foot ulcerations, no polydipsia, no polyphagia, no polyuria, no visual change, no weakness and no weight loss. There are no hypoglycemic complications. Diabetic complications include impotence. Risk factors for coronary artery disease include diabetes mellitus, dyslipidemia, family history, obesity, male sex and hypertension. Current diabetic treatment includes diet and oral agent (dual therapy). He is compliant with treatment all of the time. His weight is decreasing steadily. He is following a generally healthy diet. When asked about meal planning, he reported none. He has not had a previous visit with a dietitian. He participates in exercise intermittently. An ACE inhibitor/angiotensin II receptor blocker is being taken. He does not see a podiatrist.Eye exam is current.   Hyperlipidemia  This is a chronic problem. The current episode started more than 1 year ago. The problem is controlled. Recent lipid tests were reviewed and are low. Exacerbating diseases include diabetes and obesity. There are no known factors aggravating his hyperlipidemia. Current antihyperlipidemic treatment includes statins. The current treatment provides significant improvement of lipids. There are no compliance problems.    Hypertension  This is a chronic problem. The problem is unchanged. The problem is controlled. Pertinent negatives include no blurred vision, neck pain or palpitations. There are no associated agents to hypertension. Past treatments include ACE inhibitors. The current treatment provides significant  "improvement. Compliance problems include medication side effects.         Review of Systems   Constitutional:  Negative for activity change, appetite change, chills, diaphoresis, fatigue, fever, unexpected weight change and weight loss.   HENT:  Negative for hearing loss, nosebleeds, postnasal drip, sore throat, trouble swallowing and voice change.    Eyes:  Negative for blurred vision, photophobia, pain, discharge, redness, itching and visual disturbance.   Respiratory:  Negative for cough, choking, chest tightness and wheezing.    Cardiovascular:  Negative for palpitations and leg swelling.   Gastrointestinal:  Negative for abdominal distention, abdominal pain, blood in stool, constipation, diarrhea, nausea and vomiting.   Endocrine: Negative for cold intolerance, heat intolerance, polydipsia, polyphagia and polyuria.   Genitourinary:  Positive for impotence. Negative for dysuria, flank pain, frequency, hematuria and urgency.   Musculoskeletal:  Negative for arthralgias, back pain, joint swelling, neck pain and neck stiffness.   Skin:  Negative for rash and wound.   Allergic/Immunologic: Negative for immunocompromised state.   Neurological:  Negative for syncope, facial asymmetry, weakness, light-headedness and numbness.   Hematological:  Negative for adenopathy. Does not bruise/bleed easily.   Psychiatric/Behavioral:  Negative for agitation, behavioral problems, dysphoric mood, hallucinations, self-injury, sleep disturbance and suicidal ideas.        Objective   /88 (BP Location: Right arm, Patient Position: Sitting, BP Cuff Size: Large adult)   Pulse 100   Temp 36.4 °C (97.5 °F) (Temporal)   Resp 18   Ht 1.854 m (6' 1\")   Wt 116 kg (255 lb)   SpO2 94%   BMI 33.64 kg/m²     Physical Exam  Constitutional:       General: He is not in acute distress.     Appearance: He is not ill-appearing or diaphoretic.   HENT:      Head: Normocephalic and atraumatic.      Right Ear: External ear normal.      Left " Ear: External ear normal.      Nose: Nose normal. No rhinorrhea.   Eyes:      General: Lids are normal. No scleral icterus.        Right eye: No discharge.         Left eye: No discharge.      Conjunctiva/sclera: Conjunctivae normal.   Cardiovascular:      Rate and Rhythm: Normal rate and regular rhythm.      Pulses: Normal pulses.      Heart sounds: No murmur heard.  Pulmonary:      Effort: Pulmonary effort is normal. No respiratory distress.      Breath sounds: No decreased breath sounds, wheezing, rhonchi or rales.   Abdominal:      General: Bowel sounds are normal. There is no distension.      Palpations: Abdomen is soft. There is no mass.      Tenderness: There is no abdominal tenderness. There is no guarding or rebound.   Musculoskeletal:         General: No swelling, tenderness or deformity.      Cervical back: No rigidity or tenderness.      Right lower leg: No edema.      Left lower leg: No edema.   Lymphadenopathy:      Cervical: No cervical adenopathy.      Upper Body:      Right upper body: No supraclavicular adenopathy.      Left upper body: No supraclavicular adenopathy.   Skin:     General: Skin is warm and dry.      Coloration: Skin is not jaundiced or pale.      Findings: No erythema, lesion or rash.   Neurological:      General: No focal deficit present.      Mental Status: He is alert and oriented to person, place, and time.      Sensory: No sensory deficit.      Motor: No weakness or tremor.      Coordination: Coordination normal.      Gait: Gait normal.   Psychiatric:         Mood and Affect: Mood normal. Affect is not inappropriate.         Behavior: Behavior normal.         Assessment/Plan   Diagnoses and all orders for this visit:  Type 2 diabetes mellitus without complication, without long-term current use of insulin (CMS/[facility])  -     Follow Up In [facility] - PCP  -     losartan (Cozaar) 25 mg tablet; Take 1 tablet (25 mg) by mouth once daily.  -     Albumin , Urine Random;  Future  -     CBC and Auto Differential; Future  -     Comprehensive Metabolic Panel; Future  -     Hemoglobin A1C; Future  -     Lipid Panel; Future  -     Magnesium; Future  -     TSH with reflex to Free T4 if abnormal; Future  -     Testosterone,Free and Total; Future  -     tirzepatide (Mounjaro) 7.5 mg/0.5 mL pen injector; Inject 7.5 mg under the skin 1 (one) time per week.  Primary hypertension  -     Follow Up In [facility] - PCP  -     losartan (Cozaar) 25 mg tablet; Take 1 tablet (25 mg) by mouth once daily.  -     Albumin , Urine Random; Future  -     CBC and Auto Differential; Future  -     Comprehensive Metabolic Panel; Future  -     Lipid Panel; Future  -     Magnesium; Future  -     TSH with reflex to Free T4 if abnormal; Future  Hypertriglyceridemia  -     Follow Up In [facility] - PCP  -     Comprehensive Metabolic Panel; Future  -     Lipid Panel; Future  -     TSH with reflex to Free T4 if abnormal; Future  Class 1 obesity due to excess calories with serious comorbidity and body mass index (BMI) of 33.0 to 33.9 in adult  -     Comprehensive Metabolic Panel; Future  -     Lipid Panel; Future  -     TSH with reflex to Free T4 if abnormal; Future  -     Testosterone,Free and Total; Future  Combined arterial insufficiency and corporo-venous occlusive erectile dysfunction  -     tadalafil (Cialis) 5 mg tablet; Take 1 tablet (5 mg) by mouth once daily.  Gingivitis  -     chlorhexidine (Peridex) 0.12 % solution; Use 15 mL in the mouth or throat 2 times a day.  Infertile male syndrome  -     Referral to Urology; Future

## 2023-06-28 NOTE — TELEPHONE ENCOUNTER
Tried to schedule patient for his Reproductive Endocrinology referral and was informed this referral is only for females.     Patient will need a new referral placed for Urology who can then order a semen analysis from there and assist the patient.     Please place Urology referral using same diagnosis of Infertile male syndrome.     Thank you.

## 2023-10-03 DIAGNOSIS — E34.52 INFERTILE MALE SYNDROME: Primary | ICD-10-CM

## 2023-10-05 ENCOUNTER — LAB (OUTPATIENT)
Dept: LAB | Facility: LAB | Age: 38
End: 2023-10-05
Payer: COMMERCIAL

## 2023-10-05 ENCOUNTER — ANCILLARY PROCEDURE (OUTPATIENT)
Dept: ENDOCRINOLOGY | Facility: CLINIC | Age: 38
End: 2023-10-05
Payer: COMMERCIAL

## 2023-10-05 DIAGNOSIS — Z31.41 ENCOUNTER FOR SEMEN ANALYSIS: ICD-10-CM

## 2023-10-05 DIAGNOSIS — E11.9 TYPE 2 DIABETES MELLITUS WITHOUT COMPLICATION, WITHOUT LONG-TERM CURRENT USE OF INSULIN (MULTI): ICD-10-CM

## 2023-10-05 DIAGNOSIS — Z31.41 ENCOUNTER FOR FERTILITY TESTING: Primary | ICD-10-CM

## 2023-10-05 DIAGNOSIS — E78.1 HYPERTRIGLYCERIDEMIA: ICD-10-CM

## 2023-10-05 DIAGNOSIS — E66.09 CLASS 1 OBESITY DUE TO EXCESS CALORIES WITH SERIOUS COMORBIDITY AND BODY MASS INDEX (BMI) OF 33.0 TO 33.9 IN ADULT: ICD-10-CM

## 2023-10-05 DIAGNOSIS — I10 PRIMARY HYPERTENSION: ICD-10-CM

## 2023-10-05 LAB
% EX RESIDUAL CYTOPLASM (SEMEN): 0.3 %
% HEAD DEFECTS (SEMEN): 94.5 %
% NECK MIDPIECE (SEMEN): 14 %
% NORMAL (SEMEN): 5 % (ref 4–?)
% TAIL DEFECTS (SEMEN): 9 %
ABSTINENCE (DAYS): 3 DAYS (ref 2–7)
AGGLUTINATION (SEMEN): NO
ALBUMIN SERPL BCP-MCNC: 4.9 G/DL (ref 3.4–5)
ALP SERPL-CCNC: 77 U/L (ref 33–120)
ALT SERPL W P-5'-P-CCNC: 52 U/L (ref 10–52)
ANALYZED TIME:: ABNORMAL
ANDROLOGY LAB ID#: ABNORMAL
ANION GAP SERPL CALC-SCNC: 17 MMOL/L (ref 10–20)
AST SERPL W P-5'-P-CCNC: 28 U/L (ref 9–39)
BASOPHILS # BLD AUTO: 0.06 X10*3/UL (ref 0–0.1)
BASOPHILS NFR BLD AUTO: 0.6 %
BILIRUB SERPL-MCNC: 0.8 MG/DL (ref 0–1.2)
BUN SERPL-MCNC: 13 MG/DL (ref 6–23)
CALCIUM SERPL-MCNC: 9.6 MG/DL (ref 8.6–10.3)
CHLORIDE SERPL-SCNC: 98 MMOL/L (ref 98–107)
CHOLEST SERPL-MCNC: 103 MG/DL (ref 0–199)
CHOLESTEROL/HDL RATIO: 2.5
CLUMPS (SEMEN): YES
CO2 SERPL-SCNC: 23 MMOL/L (ref 21–32)
COLLECTED COMPLETELY: YES
COLLECTION LOCATION:: ABNORMAL
COLLECTION METHOD:: ABNORMAL
CONCENTRATION(SEMEN): 35.93 MILL/ML (ref 15–?)
CREAT SERPL-MCNC: 1.11 MG/DL (ref 0.5–1.3)
DEBRIS (SEMEN): YES
EOSINOPHIL # BLD AUTO: 0.17 X10*3/UL (ref 0–0.7)
EOSINOPHIL NFR BLD AUTO: 1.8 %
ERYTHROCYTE [DISTWIDTH] IN BLOOD BY AUTOMATED COUNT: 12.2 % (ref 11.5–14.5)
EST. AVERAGE GLUCOSE BLD GHB EST-MCNC: 154 MG/DL
FSH SERPL-ACNC: 3.4 IU/L
GFR SERPL CREATININE-BSD FRML MDRD: 88 ML/MIN/1.73M*2
GLUCOSE SERPL-MCNC: 245 MG/DL (ref 74–99)
HBA1C MFR BLD: 7 %
HCT VFR BLD AUTO: 45.8 % (ref 41–52)
HDLC SERPL-MCNC: 41.2 MG/DL
HGB BLD-MCNC: 15.3 G/DL (ref 13.5–17.5)
IMM GRANULOCYTES # BLD AUTO: 0.04 X10*3/UL (ref 0–0.7)
IMM GRANULOCYTES NFR BLD AUTO: 0.4 % (ref 0–0.9)
LDLC SERPL CALC-MCNC: 36 MG/DL (ref 130–180)
LEUKOCYTE (SEMEN): NEGATIVE
LYMPHOCYTES # BLD AUTO: 2.31 X10*3/UL (ref 1.2–4.8)
LYMPHOCYTES NFR BLD AUTO: 24.1 %
MAGNESIUM SERPL-MCNC: 2 MG/DL (ref 1.6–2.4)
MCH RBC QN AUTO: 31 PG (ref 26–34)
MCHC RBC AUTO-ENTMCNC: 33.4 G/DL (ref 32–36)
MCV RBC AUTO: 93 FL (ref 80–100)
MONOCYTES # BLD AUTO: 0.76 X10*3/UL (ref 0.1–1)
MONOCYTES NFR BLD AUTO: 7.9 %
NEUTROPHILS # BLD AUTO: 6.25 X10*3/UL (ref 1.2–7.7)
NEUTROPHILS NFR BLD AUTO: 65.2 %
NON HDL CHOLESTEROL: 62 MG/DL (ref 0–149)
NON PROG. MOTILITY (SEMEN): 7 %
NRBC BLD-RTO: 0 /100 WBCS (ref 0–0)
PLATELET # BLD AUTO: 266 X10*3/UL (ref 150–450)
PMV BLD AUTO: 10.6 FL (ref 7.5–11.5)
POTASSIUM SERPL-SCNC: 4.2 MMOL/L (ref 3.5–5.3)
PROG. MOTILITY (SEMEN): 9 % (ref 32–?)
PROT SERPL-MCNC: 7.4 G/DL (ref 6.4–8.2)
RBC # BLD AUTO: 4.93 X10*6/UL (ref 4.5–5.9)
RECEIVED TIME:: ABNORMAL
SEMEN APPEARANCE: NORMAL
SEMEN LIQUEFACTION: NORMAL
SEMEN VISCOSITY: NORMAL
SODIUM SERPL-SCNC: 134 MMOL/L (ref 136–145)
TOT. NO OF NORM. MOTILE SPERM (SEMEN): 1.98 MILL
TOT. NO OF NORM. SPERM (SEMEN): 0.33 MILL
TOTAL MOTILITY (SEMEN): 17 % (ref 40–?)
TOTAL NO OF MOTILE (SEMEN): 6.54 MILL
TOTAL NO OF RND CELLS (SEMEN): 1.3 MILL (ref ?–5)
TOTAL NO OF SPERM (SEMEN): 39.52 MILL (ref 39–?)
TRIGL SERPL-MCNC: 131 MG/DL (ref 0–149)
TSH SERPL-ACNC: 3.58 MIU/L (ref 0.44–3.98)
VLDL: 26 MG/DL (ref 0–40)
VOLUME (SEMEN): 1.1 ML (ref 1.5–?)
WBC # BLD AUTO: 9.6 X10*3/UL (ref 4.4–11.3)

## 2023-10-05 PROCEDURE — 84402 ASSAY OF FREE TESTOSTERONE: CPT

## 2023-10-05 PROCEDURE — 80050 GENERAL HEALTH PANEL: CPT

## 2023-10-05 PROCEDURE — 83735 ASSAY OF MAGNESIUM: CPT

## 2023-10-05 PROCEDURE — 36415 COLL VENOUS BLD VENIPUNCTURE: CPT

## 2023-10-05 PROCEDURE — 89322 SEMEN ANAL STRICT CRITERIA: CPT | Performed by: OBSTETRICS & GYNECOLOGY

## 2023-10-05 PROCEDURE — 80061 LIPID PANEL: CPT

## 2023-10-05 PROCEDURE — 83001 ASSAY OF GONADOTROPIN (FSH): CPT

## 2023-10-05 PROCEDURE — 83036 HEMOGLOBIN GLYCOSYLATED A1C: CPT

## 2023-10-09 LAB
TESTOSTERONE FREE (CHAN): 111 PG/ML (ref 35–155)
TESTOSTERONE,TOTAL,LC-MS/MS: 487 NG/DL (ref 250–1100)

## 2023-10-12 ENCOUNTER — OFFICE VISIT (OUTPATIENT)
Dept: UROLOGY | Facility: CLINIC | Age: 38
End: 2023-10-12
Payer: COMMERCIAL

## 2023-10-12 VITALS
WEIGHT: 252 LBS | BODY MASS INDEX: 33.4 KG/M2 | HEIGHT: 73 IN | TEMPERATURE: 97.3 F | SYSTOLIC BLOOD PRESSURE: 147 MMHG | HEART RATE: 65 BPM | DIASTOLIC BLOOD PRESSURE: 98 MMHG

## 2023-10-12 DIAGNOSIS — E29.1 HYPOGONADISM IN MALE: Primary | ICD-10-CM

## 2023-10-12 DIAGNOSIS — N52.1 ERECTILE DYSFUNCTION DUE TO DISEASES CLASSIFIED ELSEWHERE: ICD-10-CM

## 2023-10-12 PROCEDURE — 3051F HG A1C>EQUAL 7.0%<8.0%: CPT | Performed by: UROLOGY

## 2023-10-12 PROCEDURE — 3048F LDL-C <100 MG/DL: CPT | Performed by: UROLOGY

## 2023-10-12 PROCEDURE — 99214 OFFICE O/P EST MOD 30 MIN: CPT | Performed by: UROLOGY

## 2023-10-12 PROCEDURE — 4010F ACE/ARB THERAPY RXD/TAKEN: CPT | Performed by: UROLOGY

## 2023-10-12 PROCEDURE — 3008F BODY MASS INDEX DOCD: CPT | Performed by: UROLOGY

## 2023-10-12 PROCEDURE — 3077F SYST BP >= 140 MM HG: CPT | Performed by: UROLOGY

## 2023-10-12 PROCEDURE — 3080F DIAST BP >= 90 MM HG: CPT | Performed by: UROLOGY

## 2023-10-12 PROCEDURE — 1036F TOBACCO NON-USER: CPT | Performed by: UROLOGY

## 2023-12-12 ENCOUNTER — OFFICE VISIT (OUTPATIENT)
Dept: PRIMARY CARE | Facility: CLINIC | Age: 38
End: 2023-12-12
Payer: COMMERCIAL

## 2023-12-12 VITALS
RESPIRATION RATE: 18 BRPM | BODY MASS INDEX: 34.72 KG/M2 | OXYGEN SATURATION: 95 % | WEIGHT: 262 LBS | HEART RATE: 94 BPM | SYSTOLIC BLOOD PRESSURE: 137 MMHG | HEIGHT: 73 IN | TEMPERATURE: 97.5 F | DIASTOLIC BLOOD PRESSURE: 90 MMHG

## 2023-12-12 DIAGNOSIS — E78.1 HYPERTRIGLYCERIDEMIA: ICD-10-CM

## 2023-12-12 DIAGNOSIS — R80.9 TYPE 2 DIABETES MELLITUS WITH MICROALBUMINURIA, WITHOUT LONG-TERM CURRENT USE OF INSULIN (MULTI): Primary | ICD-10-CM

## 2023-12-12 DIAGNOSIS — Z23 ENCOUNTER FOR IMMUNIZATION: ICD-10-CM

## 2023-12-12 DIAGNOSIS — I10 PRIMARY HYPERTENSION: ICD-10-CM

## 2023-12-12 DIAGNOSIS — E11.29 TYPE 2 DIABETES MELLITUS WITH MICROALBUMINURIA, WITHOUT LONG-TERM CURRENT USE OF INSULIN (MULTI): Primary | ICD-10-CM

## 2023-12-12 DIAGNOSIS — E66.09 CLASS 1 OBESITY DUE TO EXCESS CALORIES WITH SERIOUS COMORBIDITY AND BODY MASS INDEX (BMI) OF 34.0 TO 34.9 IN ADULT: ICD-10-CM

## 2023-12-12 PROCEDURE — 4010F ACE/ARB THERAPY RXD/TAKEN: CPT | Performed by: FAMILY MEDICINE

## 2023-12-12 PROCEDURE — 3075F SYST BP GE 130 - 139MM HG: CPT | Performed by: FAMILY MEDICINE

## 2023-12-12 PROCEDURE — 3080F DIAST BP >= 90 MM HG: CPT | Performed by: FAMILY MEDICINE

## 2023-12-12 PROCEDURE — 99395 PREV VISIT EST AGE 18-39: CPT | Performed by: FAMILY MEDICINE

## 2023-12-12 PROCEDURE — 90686 IIV4 VACC NO PRSV 0.5 ML IM: CPT | Performed by: FAMILY MEDICINE

## 2023-12-12 PROCEDURE — 1036F TOBACCO NON-USER: CPT | Performed by: FAMILY MEDICINE

## 2023-12-12 PROCEDURE — 3051F HG A1C>EQUAL 7.0%<8.0%: CPT | Performed by: FAMILY MEDICINE

## 2023-12-12 PROCEDURE — 90480 ADMN SARSCOV2 VAC 1/ONLY CMP: CPT | Performed by: FAMILY MEDICINE

## 2023-12-12 PROCEDURE — 90471 IMMUNIZATION ADMIN: CPT | Performed by: FAMILY MEDICINE

## 2023-12-12 PROCEDURE — 91322 SARSCOV2 VAC 50 MCG/0.5ML IM: CPT | Performed by: FAMILY MEDICINE

## 2023-12-12 PROCEDURE — 3048F LDL-C <100 MG/DL: CPT | Performed by: FAMILY MEDICINE

## 2023-12-12 PROCEDURE — 3008F BODY MASS INDEX DOCD: CPT | Performed by: FAMILY MEDICINE

## 2023-12-12 RX ORDER — LOSARTAN POTASSIUM 50 MG/1
50 TABLET ORAL DAILY
Qty: 90 TABLET | Refills: 3 | Status: SHIPPED | OUTPATIENT
Start: 2023-12-12 | End: 2024-12-11

## 2023-12-12 RX ORDER — ROSUVASTATIN CALCIUM 10 MG/1
10 TABLET, COATED ORAL DAILY
Qty: 90 TABLET | Refills: 3 | Status: SHIPPED | OUTPATIENT
Start: 2023-12-12 | End: 2024-12-11

## 2023-12-12 ASSESSMENT — ENCOUNTER SYMPTOMS
DIAPHORESIS: 0
EYE PAIN: 0
ADENOPATHY: 0
SLEEP DISTURBANCE: 0
FLANK PAIN: 0
CHOKING: 0
DYSURIA: 0
POLYPHAGIA: 0
NAUSEA: 0
FACIAL ASYMMETRY: 0
SORE THROAT: 0
HYPERTENSION: 1
BLURRED VISION: 0
ACTIVITY CHANGE: 0
ARTHRALGIAS: 0
NUMBNESS: 0
ABDOMINAL DISTENTION: 0
EYE ITCHING: 0
JOINT SWELLING: 0
CHEST TIGHTNESS: 0
APPETITE CHANGE: 0
BLOOD IN STOOL: 0
CONSTIPATION: 0
FATIGUE: 0
UNEXPECTED WEIGHT CHANGE: 0
VISUAL CHANGE: 0
VOMITING: 0
WOUND: 0
LIGHT-HEADEDNESS: 0
TROUBLE SWALLOWING: 0
HALLUCINATIONS: 0
ABDOMINAL PAIN: 0
BRUISES/BLEEDS EASILY: 0
PALPITATIONS: 0
NECK PAIN: 0
PHOTOPHOBIA: 0
WEIGHT LOSS: 0
FEVER: 0
EYE DISCHARGE: 0
DYSPHORIC MOOD: 0
AGITATION: 0
DIARRHEA: 0
NECK STIFFNESS: 0
WEAKNESS: 0
FREQUENCY: 0
HEMATURIA: 0
BACK PAIN: 0
POLYDIPSIA: 0
COUGH: 0
EYE REDNESS: 0
WHEEZING: 0
CHILLS: 0
VOICE CHANGE: 0

## 2023-12-12 NOTE — PROGRESS NOTES
Subjective   Patient ID: Markus Perez is a 38 y.o. male who presents for Annual Exam (And review labs ).    Subjective  Markus Perez is a 38 y.o. male and is here for a comprehensive physical exam. The patient reports see below.    Do you take any herbs or supplements that were not prescribed by a doctor? no  Are you taking calcium supplements? no  Are you taking aspirin daily? no      History:  Any STD's in the past? none    Do you have pain that bothers you in your daily life? no      Diabetes  He presents for his follow-up diabetic visit. He has type 2 diabetes mellitus. His disease course has been improving. There are no hypoglycemic associated symptoms. Pertinent negatives for diabetes include no blurred vision, no fatigue, no foot paresthesias, no foot ulcerations, no polydipsia, no polyphagia, no polyuria, no visual change, no weakness and no weight loss. There are no hypoglycemic complications. Diabetic complications include impotence. Risk factors for coronary artery disease include diabetes mellitus, dyslipidemia, family history, obesity, male sex and hypertension. Current diabetic treatment includes diet and oral agent (dual therapy). He is compliant with treatment all of the time. His weight is decreasing steadily. He is following a generally healthy diet. When asked about meal planning, he reported none. He has not had a previous visit with a dietitian. He participates in exercise intermittently. An ACE inhibitor/angiotensin II receptor blocker is being taken. He does not see a podiatrist.Eye exam is current.   Hyperlipidemia  This is a chronic problem. The current episode started more than 1 year ago. The problem is controlled. Recent lipid tests were reviewed and are low. Exacerbating diseases include diabetes and obesity. There are no known factors aggravating his hyperlipidemia. Current antihyperlipidemic treatment includes statins. The current treatment provides significant improvement of  lipids. There are no compliance problems.    Hypertension  This is a chronic problem. The problem is unchanged. The problem is controlled. Pertinent negatives include no blurred vision, neck pain or palpitations. There are no associated agents to hypertension. Past treatments include ACE inhibitors. The current treatment provides significant improvement. Compliance problems include medication side effects.         Review of Systems   Constitutional:  Negative for activity change, appetite change, chills, diaphoresis, fatigue, fever, unexpected weight change and weight loss.   HENT:  Negative for hearing loss, nosebleeds, postnasal drip, sore throat, trouble swallowing and voice change.    Eyes:  Negative for blurred vision, photophobia, pain, discharge, redness, itching and visual disturbance.   Respiratory:  Negative for cough, choking, chest tightness and wheezing.    Cardiovascular:  Negative for palpitations and leg swelling.   Gastrointestinal:  Negative for abdominal distention, abdominal pain, blood in stool, constipation, diarrhea, nausea and vomiting.   Endocrine: Negative for cold intolerance, heat intolerance, polydipsia, polyphagia and polyuria.   Genitourinary:  Positive for impotence. Negative for dysuria, flank pain, frequency, hematuria and urgency.   Musculoskeletal:  Negative for arthralgias, back pain, joint swelling, neck pain and neck stiffness.   Skin:  Negative for rash and wound.   Allergic/Immunologic: Negative for immunocompromised state.   Neurological:  Negative for syncope, facial asymmetry, weakness, light-headedness and numbness.   Hematological:  Negative for adenopathy. Does not bruise/bleed easily.   Psychiatric/Behavioral:  Negative for agitation, behavioral problems, dysphoric mood, hallucinations, self-injury, sleep disturbance and suicidal ideas.        Objective   /90 (BP Location: Right arm, Patient Position: Sitting, BP Cuff Size: Large adult)   Pulse 94   Temp 36.4  "°C (97.5 °F) (Temporal)   Resp 18   Ht 1.854 m (6' 1\")   Wt 119 kg (262 lb)   SpO2 95%   BMI 34.57 kg/m²     Physical Exam  Constitutional:       General: He is not in acute distress.     Appearance: He is not ill-appearing or diaphoretic.   HENT:      Head: Normocephalic and atraumatic.      Right Ear: External ear normal.      Left Ear: External ear normal.      Nose: Nose normal. No rhinorrhea.   Eyes:      General: Lids are normal. No scleral icterus.        Right eye: No discharge.         Left eye: No discharge.      Conjunctiva/sclera: Conjunctivae normal.   Cardiovascular:      Rate and Rhythm: Normal rate and regular rhythm.      Pulses: Normal pulses.      Heart sounds: No murmur heard.  Pulmonary:      Effort: Pulmonary effort is normal. No respiratory distress.      Breath sounds: No decreased breath sounds, wheezing, rhonchi or rales.   Abdominal:      General: Bowel sounds are normal. There is no distension.      Palpations: Abdomen is soft. There is no mass.      Tenderness: There is no abdominal tenderness. There is no guarding or rebound.   Musculoskeletal:         General: No swelling, tenderness or deformity.      Cervical back: No rigidity or tenderness.      Right lower leg: No edema.      Left lower leg: No edema.   Lymphadenopathy:      Cervical: No cervical adenopathy.      Upper Body:      Right upper body: No supraclavicular adenopathy.      Left upper body: No supraclavicular adenopathy.   Skin:     General: Skin is warm and dry.      Coloration: Skin is not jaundiced or pale.      Findings: No erythema, lesion or rash.   Neurological:      General: No focal deficit present.      Mental Status: He is alert and oriented to person, place, and time.      Sensory: No sensory deficit.      Motor: No weakness or tremor.      Coordination: Coordination normal.      Gait: Gait normal.   Psychiatric:         Mood and Affect: Mood normal. Affect is not inappropriate.         Behavior: Behavior " normal.         Assessment/Plan   Diagnoses and all orders for this visit:  Type 2 diabetes mellitus with microalbuminuria, without long-term current use of insulin (CMS/[facility])  -     Albumin , Urine Random; Future  -     CBC and Auto Differential; Future  -     Comprehensive Metabolic Panel; Future  -     Hemoglobin A1C; Future  -     Lipid Panel; Future  -     Magnesium; Future  -     TSH with reflex to Free T4 if abnormal; Future  -     Follow Up In [facility] - PCP - Established; Future  Hypertriglyceridemia  -     rosuvastatin (Crestor) 10 mg tablet; Take 1 tablet (10 mg) by mouth once daily.  -     Comprehensive Metabolic Panel; Future  -     Lipid Panel; Future  -     TSH with reflex to Free T4 if abnormal; Future  -     Follow Up In [facility] - PCP - Established; Future  Encounter for immunization  -     Flu vaccine (IIV4) age 6 months and greater, preservative free  -     Moderna COVID-19 vaccine, [id number], monovalent, age 12 years and older, (50mcg/0.5mL)  -     Follow Up In [facility] - PCP - Established; Future  Primary hypertension  -     losartan (Cozaar) 50 mg tablet; Take 1 tablet (50 mg) by mouth once daily.  -     Albumin , Urine Random; Future  -     CBC and Auto Differential; Future  -     Comprehensive Metabolic Panel; Future  -     Lipid Panel; Future  -     Magnesium; Future  -     TSH with reflex to Free T4 if abnormal; Future  -     Follow Up In [facility] - PCP - Established; Future  Class 1 obesity due to excess calories with serious comorbidity and body mass index (BMI) of 34.0 to 34.9 in adult  -     Albumin , Urine Random; Future  -     CBC and Auto Differential; Future  -     Comprehensive Metabolic Panel; Future  -     Hemoglobin A1C; Future  -     Lipid Panel; Future  -     Magnesium; Future  -     TSH with reflex to Free T4 if abnormal; Future  -     Follow Up In [facility] - PCP - Established; Future  2. Patient  Counseling:  --Nutrition: Stressed importance of moderation in sodium/caffeine intake, saturated fat and cholesterol, caloric balance, sufficient intake of fresh fruits, vegetables, fiber, calcium, iron.  --Exercise: Stressed the importance of regular exercise.   --Substance Abuse: Discussed cessation/primary prevention of tobacco, alcohol, or other drug use; driving or other dangerous activities under the influence; availability of treatment for abuse.   --Injury prevention: Discussed safety belts, safety helmets, smoke detector, smoking near bedding or upholstery.   --Dental health: Discussed importance of regular tooth brushing, flossing, and dental visits.  --Immunizations reviewed.  --Discussed benefits of screening colonoscopy.  3. Discussed the patient's BMI with him.  The BMI is above average. The patient received Current weight: 119 kg (262 lb)  Weight change since last visit (-) denotes wt loss 10 lbs   Weight loss needed to achieve BMI 25: 72.9 Lbs  Weight loss needed to achieve BMI 30: 35.1 Lbs    Provided instructions on dietary changes  Provided instructions on exercise  Advised to Increase physical activity because they have an above normal BMI.  4. Follow up 6 mos w/labs

## 2024-03-18 DIAGNOSIS — E11.9 TYPE 2 DIABETES MELLITUS WITHOUT COMPLICATION, WITHOUT LONG-TERM CURRENT USE OF INSULIN (MULTI): ICD-10-CM

## 2024-03-18 RX ORDER — METFORMIN HYDROCHLORIDE 500 MG/1
TABLET, EXTENDED RELEASE ORAL
Qty: 360 TABLET | Refills: 0 | Status: SHIPPED | OUTPATIENT
Start: 2024-03-18

## 2024-06-16 DIAGNOSIS — E11.9 TYPE 2 DIABETES MELLITUS WITHOUT COMPLICATION, WITHOUT LONG-TERM CURRENT USE OF INSULIN (MULTI): ICD-10-CM

## 2024-06-17 RX ORDER — TIRZEPATIDE 7.5 MG/.5ML
INJECTION, SOLUTION SUBCUTANEOUS
Qty: 2 ML | Refills: 0 | Status: SHIPPED | OUTPATIENT
Start: 2024-06-17

## 2024-06-25 ENCOUNTER — LAB (OUTPATIENT)
Dept: LAB | Facility: LAB | Age: 39
End: 2024-06-25
Payer: COMMERCIAL

## 2024-06-25 DIAGNOSIS — E78.1 HYPERTRIGLYCERIDEMIA: ICD-10-CM

## 2024-06-25 DIAGNOSIS — E11.29 TYPE 2 DIABETES MELLITUS WITH MICROALBUMINURIA, WITHOUT LONG-TERM CURRENT USE OF INSULIN (MULTI): ICD-10-CM

## 2024-06-25 DIAGNOSIS — E11.9 TYPE 2 DIABETES MELLITUS WITHOUT COMPLICATION, WITHOUT LONG-TERM CURRENT USE OF INSULIN (MULTI): ICD-10-CM

## 2024-06-25 DIAGNOSIS — E66.09 CLASS 1 OBESITY DUE TO EXCESS CALORIES WITH SERIOUS COMORBIDITY AND BODY MASS INDEX (BMI) OF 34.0 TO 34.9 IN ADULT: ICD-10-CM

## 2024-06-25 DIAGNOSIS — R80.9 TYPE 2 DIABETES MELLITUS WITH MICROALBUMINURIA, WITHOUT LONG-TERM CURRENT USE OF INSULIN (MULTI): ICD-10-CM

## 2024-06-25 DIAGNOSIS — I10 PRIMARY HYPERTENSION: ICD-10-CM

## 2024-06-25 DIAGNOSIS — Z11.4 SCREENING FOR HIV (HUMAN IMMUNODEFICIENCY VIRUS): ICD-10-CM

## 2024-06-25 LAB
ALBUMIN SERPL BCP-MCNC: 4.2 G/DL (ref 3.4–5)
ALP SERPL-CCNC: 62 U/L (ref 33–120)
ALT SERPL W P-5'-P-CCNC: 33 U/L (ref 10–52)
ANION GAP SERPL CALC-SCNC: 13 MMOL/L (ref 10–20)
AST SERPL W P-5'-P-CCNC: 21 U/L (ref 9–39)
BASOPHILS # BLD AUTO: 0.05 X10*3/UL (ref 0–0.1)
BASOPHILS NFR BLD AUTO: 0.6 %
BILIRUB SERPL-MCNC: 0.5 MG/DL (ref 0–1.2)
BUN SERPL-MCNC: 10 MG/DL (ref 6–23)
CALCIUM SERPL-MCNC: 9 MG/DL (ref 8.6–10.3)
CHLORIDE SERPL-SCNC: 100 MMOL/L (ref 98–107)
CHOLEST SERPL-MCNC: 79 MG/DL (ref 0–199)
CHOLESTEROL/HDL RATIO: 1.9
CO2 SERPL-SCNC: 25 MMOL/L (ref 21–32)
CREAT SERPL-MCNC: 0.79 MG/DL (ref 0.5–1.3)
CREAT UR-MCNC: 132.8 MG/DL (ref 20–370)
EGFRCR SERPLBLD CKD-EPI 2021: >90 ML/MIN/1.73M*2
EOSINOPHIL # BLD AUTO: 0.25 X10*3/UL (ref 0–0.7)
EOSINOPHIL NFR BLD AUTO: 3 %
ERYTHROCYTE [DISTWIDTH] IN BLOOD BY AUTOMATED COUNT: 12.5 % (ref 11.5–14.5)
EST. AVERAGE GLUCOSE BLD GHB EST-MCNC: 192 MG/DL
GLUCOSE SERPL-MCNC: 170 MG/DL (ref 74–99)
HBA1C MFR BLD: 8.3 %
HCT VFR BLD AUTO: 40.5 % (ref 41–52)
HDLC SERPL-MCNC: 41 MG/DL
HGB BLD-MCNC: 13.4 G/DL (ref 13.5–17.5)
IMM GRANULOCYTES # BLD AUTO: 0.03 X10*3/UL (ref 0–0.7)
IMM GRANULOCYTES NFR BLD AUTO: 0.4 % (ref 0–0.9)
LDLC SERPL CALC-MCNC: 20 MG/DL
LYMPHOCYTES # BLD AUTO: 2.11 X10*3/UL (ref 1.2–4.8)
LYMPHOCYTES NFR BLD AUTO: 25.2 %
MAGNESIUM SERPL-MCNC: 1.72 MG/DL (ref 1.6–2.4)
MCH RBC QN AUTO: 30.5 PG (ref 26–34)
MCHC RBC AUTO-ENTMCNC: 33.1 G/DL (ref 32–36)
MCV RBC AUTO: 92 FL (ref 80–100)
MICROALBUMIN UR-MCNC: 8.6 MG/L
MICROALBUMIN/CREAT UR: 6.5 UG/MG CREAT
MONOCYTES # BLD AUTO: 0.67 X10*3/UL (ref 0.1–1)
MONOCYTES NFR BLD AUTO: 8 %
NEUTROPHILS # BLD AUTO: 5.25 X10*3/UL (ref 1.2–7.7)
NEUTROPHILS NFR BLD AUTO: 62.8 %
NON HDL CHOLESTEROL: 38 MG/DL (ref 0–149)
NRBC BLD-RTO: 0 /100 WBCS (ref 0–0)
PLATELET # BLD AUTO: 230 X10*3/UL (ref 150–450)
POTASSIUM SERPL-SCNC: 3.9 MMOL/L (ref 3.5–5.3)
PROT SERPL-MCNC: 6.7 G/DL (ref 6.4–8.2)
RBC # BLD AUTO: 4.39 X10*6/UL (ref 4.5–5.9)
SODIUM SERPL-SCNC: 134 MMOL/L (ref 136–145)
TRIGL SERPL-MCNC: 91 MG/DL (ref 0–149)
TSH SERPL-ACNC: 2.32 MIU/L (ref 0.44–3.98)
VLDL: 18 MG/DL (ref 0–40)
WBC # BLD AUTO: 8.4 X10*3/UL (ref 4.4–11.3)

## 2024-06-25 PROCEDURE — 82570 ASSAY OF URINE CREATININE: CPT

## 2024-06-25 PROCEDURE — 87389 HIV-1 AG W/HIV-1&-2 AB AG IA: CPT

## 2024-06-25 PROCEDURE — 85025 COMPLETE CBC W/AUTO DIFF WBC: CPT

## 2024-06-25 PROCEDURE — 83735 ASSAY OF MAGNESIUM: CPT

## 2024-06-25 PROCEDURE — 83036 HEMOGLOBIN GLYCOSYLATED A1C: CPT

## 2024-06-25 PROCEDURE — 82043 UR ALBUMIN QUANTITATIVE: CPT

## 2024-06-25 PROCEDURE — 80061 LIPID PANEL: CPT

## 2024-06-25 PROCEDURE — 84443 ASSAY THYROID STIM HORMONE: CPT

## 2024-06-25 PROCEDURE — 36415 COLL VENOUS BLD VENIPUNCTURE: CPT

## 2024-06-25 PROCEDURE — 80053 COMPREHEN METABOLIC PANEL: CPT

## 2024-06-27 ENCOUNTER — APPOINTMENT (OUTPATIENT)
Dept: PRIMARY CARE | Facility: CLINIC | Age: 39
End: 2024-06-27
Payer: COMMERCIAL

## 2024-06-27 VITALS
WEIGHT: 254 LBS | TEMPERATURE: 97.2 F | RESPIRATION RATE: 18 BRPM | HEART RATE: 87 BPM | BODY MASS INDEX: 33.66 KG/M2 | SYSTOLIC BLOOD PRESSURE: 123 MMHG | DIASTOLIC BLOOD PRESSURE: 81 MMHG | OXYGEN SATURATION: 95 % | HEIGHT: 73 IN

## 2024-06-27 DIAGNOSIS — Z11.4 SCREENING FOR HIV (HUMAN IMMUNODEFICIENCY VIRUS): ICD-10-CM

## 2024-06-27 DIAGNOSIS — E11.29 TYPE 2 DIABETES MELLITUS WITH MICROALBUMINURIA, WITHOUT LONG-TERM CURRENT USE OF INSULIN (MULTI): ICD-10-CM

## 2024-06-27 DIAGNOSIS — N46.9 INFERTILITY MALE: ICD-10-CM

## 2024-06-27 DIAGNOSIS — I10 PRIMARY HYPERTENSION: ICD-10-CM

## 2024-06-27 DIAGNOSIS — E11.9 TYPE 2 DIABETES MELLITUS WITHOUT COMPLICATION, WITHOUT LONG-TERM CURRENT USE OF INSULIN (MULTI): ICD-10-CM

## 2024-06-27 DIAGNOSIS — E78.1 HYPERTRIGLYCERIDEMIA: ICD-10-CM

## 2024-06-27 DIAGNOSIS — N52.03 COMBINED ARTERIAL INSUFFICIENCY AND CORPORO-VENOUS OCCLUSIVE ERECTILE DYSFUNCTION: ICD-10-CM

## 2024-06-27 DIAGNOSIS — Z11.59 ENCOUNTER FOR HEPATITIS C SCREENING TEST FOR LOW RISK PATIENT: ICD-10-CM

## 2024-06-27 DIAGNOSIS — Z00.00 ROUTINE GENERAL MEDICAL EXAMINATION AT A HEALTH CARE FACILITY: Primary | ICD-10-CM

## 2024-06-27 DIAGNOSIS — E66.09 CLASS 1 OBESITY DUE TO EXCESS CALORIES WITH SERIOUS COMORBIDITY AND BODY MASS INDEX (BMI) OF 33.0 TO 33.9 IN ADULT: ICD-10-CM

## 2024-06-27 DIAGNOSIS — R80.9 TYPE 2 DIABETES MELLITUS WITH MICROALBUMINURIA, WITHOUT LONG-TERM CURRENT USE OF INSULIN (MULTI): ICD-10-CM

## 2024-06-27 LAB — HIV 1+2 AB+HIV1 P24 AG SERPL QL IA: NONREACTIVE

## 2024-06-27 PROCEDURE — 4010F ACE/ARB THERAPY RXD/TAKEN: CPT | Performed by: FAMILY MEDICINE

## 2024-06-27 PROCEDURE — 3061F NEG MICROALBUMINURIA REV: CPT | Performed by: FAMILY MEDICINE

## 2024-06-27 PROCEDURE — 3048F LDL-C <100 MG/DL: CPT | Performed by: FAMILY MEDICINE

## 2024-06-27 PROCEDURE — 3052F HG A1C>EQUAL 8.0%<EQUAL 9.0%: CPT | Performed by: FAMILY MEDICINE

## 2024-06-27 PROCEDURE — 1036F TOBACCO NON-USER: CPT | Performed by: FAMILY MEDICINE

## 2024-06-27 PROCEDURE — 3079F DIAST BP 80-89 MM HG: CPT | Performed by: FAMILY MEDICINE

## 2024-06-27 PROCEDURE — 3074F SYST BP LT 130 MM HG: CPT | Performed by: FAMILY MEDICINE

## 2024-06-27 PROCEDURE — 99395 PREV VISIT EST AGE 18-39: CPT | Performed by: FAMILY MEDICINE

## 2024-06-27 PROCEDURE — 3008F BODY MASS INDEX DOCD: CPT | Performed by: FAMILY MEDICINE

## 2024-06-27 RX ORDER — METFORMIN HYDROCHLORIDE 500 MG/1
TABLET, EXTENDED RELEASE ORAL
Qty: 360 TABLET | Refills: 3 | Status: SHIPPED | OUTPATIENT
Start: 2024-06-27

## 2024-06-27 RX ORDER — ROSUVASTATIN CALCIUM 10 MG/1
10 TABLET, COATED ORAL DAILY
Qty: 90 TABLET | Refills: 3 | Status: SHIPPED | OUTPATIENT
Start: 2024-06-27 | End: 2025-06-27

## 2024-06-27 RX ORDER — TADALAFIL 5 MG/1
5 TABLET ORAL DAILY
Qty: 90 TABLET | Refills: 3 | Status: SHIPPED | OUTPATIENT
Start: 2024-06-27 | End: 2025-06-27

## 2024-06-27 RX ORDER — TIRZEPATIDE 7.5 MG/.5ML
0.5 INJECTION, SOLUTION SUBCUTANEOUS
Qty: 6 ML | Refills: 3 | Status: SHIPPED | OUTPATIENT
Start: 2024-06-30

## 2024-06-27 RX ORDER — LOSARTAN POTASSIUM 50 MG/1
50 TABLET ORAL DAILY
Qty: 90 TABLET | Refills: 3 | Status: SHIPPED | OUTPATIENT
Start: 2024-06-27 | End: 2025-06-27

## 2024-06-27 ASSESSMENT — ENCOUNTER SYMPTOMS
ARTHRALGIAS: 0
COUGH: 0
EYE PAIN: 0
CHILLS: 0
UNEXPECTED WEIGHT CHANGE: 0
DIARRHEA: 0
JOINT SWELLING: 0
NUMBNESS: 0
BLOOD IN STOOL: 0
AGITATION: 0
CHOKING: 0
POLYDIPSIA: 0
HALLUCINATIONS: 0
FLANK PAIN: 0
FREQUENCY: 0
DYSURIA: 0
WEIGHT LOSS: 0
ACTIVITY CHANGE: 0
VISUAL CHANGE: 0
TROUBLE SWALLOWING: 0
FACIAL ASYMMETRY: 0
NAUSEA: 0
BLURRED VISION: 0
NECK PAIN: 0
ABDOMINAL DISTENTION: 0
POLYPHAGIA: 0
HYPERTENSION: 1
APPETITE CHANGE: 0
PALPITATIONS: 0
HEMATURIA: 0
NECK STIFFNESS: 0
FATIGUE: 0
ABDOMINAL PAIN: 0
LIGHT-HEADEDNESS: 0
EYE DISCHARGE: 0
CHEST TIGHTNESS: 0
WHEEZING: 0
WEAKNESS: 0
VOMITING: 0
PHOTOPHOBIA: 0
BACK PAIN: 0
WOUND: 0
BRUISES/BLEEDS EASILY: 0
DYSPHORIC MOOD: 0
DIAPHORESIS: 0
CONSTIPATION: 0
SLEEP DISTURBANCE: 0
FEVER: 0
EYE REDNESS: 0
VOICE CHANGE: 0
EYE ITCHING: 0
ADENOPATHY: 0
SORE THROAT: 0

## 2024-07-30 ENCOUNTER — APPOINTMENT (OUTPATIENT)
Dept: UROLOGY | Facility: CLINIC | Age: 39
End: 2024-07-30
Payer: COMMERCIAL

## 2024-07-30 VITALS — TEMPERATURE: 98.2 F | BODY MASS INDEX: 33.13 KG/M2 | HEIGHT: 73 IN | WEIGHT: 250 LBS

## 2024-07-30 DIAGNOSIS — N52.1 ERECTILE DYSFUNCTION DUE TO DISEASES CLASSIFIED ELSEWHERE: Primary | ICD-10-CM

## 2024-07-30 DIAGNOSIS — N46.9 INFERTILITY MALE: ICD-10-CM

## 2024-07-30 DIAGNOSIS — R86.8 LOW VOLUME OF EJACULATED SEMEN: ICD-10-CM

## 2024-07-30 PROCEDURE — 99203 OFFICE O/P NEW LOW 30 MIN: CPT | Performed by: UROLOGY

## 2024-07-30 PROCEDURE — 4010F ACE/ARB THERAPY RXD/TAKEN: CPT | Performed by: UROLOGY

## 2024-07-30 PROCEDURE — 3008F BODY MASS INDEX DOCD: CPT | Performed by: UROLOGY

## 2024-07-30 PROCEDURE — 3048F LDL-C <100 MG/DL: CPT | Performed by: UROLOGY

## 2024-07-30 PROCEDURE — 1036F TOBACCO NON-USER: CPT | Performed by: UROLOGY

## 2024-07-30 PROCEDURE — 3061F NEG MICROALBUMINURIA REV: CPT | Performed by: UROLOGY

## 2024-07-30 PROCEDURE — 3052F HG A1C>EQUAL 8.0%<EQUAL 9.0%: CPT | Performed by: UROLOGY

## 2024-07-30 ASSESSMENT — PAIN SCALES - GENERAL: PAINLEVEL: 0-NO PAIN

## 2024-07-30 NOTE — PROGRESS NOTES
HPI:  39 y.o. male with DM  referred to me by  Dr. Gregory for concern of decreased ejaculate volume    Dr. Gregory's note reivewed    Partner/wife name: Shikha Escobar  Partner/wife  : [id number]  Attempting Pregnancy for :   6 monthsto 1 year  Previous pregnancies for either partner: dank has 7yo child    Previous Semen analysis / Labs:   Component      Latest Ref Rng 10/5/2023   Andrology Lab ID# [id number]    Semen Appearance Normal    Semen Viscosity Normal    Semen Liquefaction Normal    Debris (Semen) Yes    Clumps (Semen) Yes    Agglutination (Semen) No    Volume (Semen)      1.5 mL 1.1 !    Concentration(Semen)      15 mill/mL 35.93    Total Motility (Semen)      40 % 17 !    Prog. Motility (Semen)      32 % 9 !    Non Prog. Motility (Semen)      % 7    Total No of Sperm (Semen)      39 mill 39.52    Total No of Motile (Semen)      mill 6.54    Total No of Rnd Cells (Semen)      5 mill 1.3    Leukocyte (Semen) Negative    % Normal (Semen)      4 % 5.0    % Head defects (Semen)      % 94.5    % Neck Midpiece (Semen)      % 14.0    % Tail defects (Semen)      % 9.0    % Ex Residual Cytoplasm (Semen)      % 0.3    Tot. No of Norm. Motile Sperm (Semen)      mill 1.976    Tot. No of Norm. Sperm (Semen)      mill 0.327    Abstinence (days)      2 - 7 days 3    Collected Completely Yes    Collection Location Center    Collection Method Masturbation    Received time 12:03 PM    Analyzed Time 12:25 PM    LEUKOCYTES (10*3/UL) IN BLOOD BY AUTOMATED COUNT, [place]      4.4 - 11.3 x10*3/uL    nRBC      0.0 - 0.0 /100 WBCs    ERYTHROCYTES (10*6/UL) IN BLOOD BY AUTOMATED COUNT, [place]      4.50 - 5.90 x10*6/uL    HEMOGLOBIN      13.5 - 17.5 g/dL    HEMATOCRIT      41.0 - 52.0 %    MCV      80 - 100 fL    MCH      26.0 - 34.0 pg    MCHC      32.0 - 36.0 g/dL    RED CELL DISTRIBUTION WIDTH      11.5 - 14.5 %    PLATELETS (10*3/UL) IN BLOOD AUTOMATED COUNT, [place]      150 - 450 x10*3/uL    NEUTROPHILS/100 LEUKOCYTES IN BLOOD  BY AUTOMATED COUNT, [place]      40.0 - 80.0 %    Immature Granulocytes %, Automated      0.0 - 0.9 %    Lymphocytes %      13.0 - 44.0 %    Monocytes %      2.0 - 10.0 %    Eosinophils %      0.0 - 6.0 %    Basophils %      0.0 - 2.0 %    NEUTROPHILS (10*3/UL) IN BLOOD BY AUTOMATED COUNT, [place]      1.20 - 7.70 x10*3/uL    Immature Granulocytes Absolute, Automated      0.00 - 0.70 x10*3/uL    Lymphocytes Absolute      1.20 - 4.80 x10*3/uL    Monocytes Absolute      0.10 - 1.00 x10*3/uL    Eosinophils Absolute      0.00 - 0.70 x10*3/uL    Basophils Absolute      0.00 - 0.10 x10*3/uL    GLUCOSE      74 - 99 mg/dL    SODIUM      136 - 145 mmol/L    POTASSIUM      3.5 - 5.3 mmol/L    CHLORIDE      98 - 107 mmol/L    Bicarbonate      21 - 32 mmol/L    Anion Gap      10 - 20 mmol/L    Blood Urea Nitrogen      6 - 23 mg/dL    Creatinine      0.50 - 1.30 mg/dL    EGFR      >60 mL/min/1.73m*2    Calcium      8.6 - 10.3 mg/dL    Albumin      3.4 - 5.0 g/dL    Alkaline Phosphatase      33 - 120 U/L    Total Protein      6.4 - 8.2 g/dL    AST      9 - 39 U/L    Bilirubin Total      0.0 - 1.2 mg/dL    ALT      10 - 52 U/L    Testosterone, Free      35.0 - 155.0 pg/mL 111.0    Testosterone, Total, LC-MS/MS      250 - 1100 ng/dL 487    Hemoglobin A1C      see below %    Estimated Average Glucose      Not Established mg/dL      Component      Latest Ref Rng 6/25/2024   Andrology Lab ID#    Semen Appearance    Semen Viscosity    Semen Liquefaction    Debris (Semen)    Clumps (Semen)    Agglutination (Semen)    Volume (Semen)      1.5 mL    Concentration(Semen)      15 mill/mL    Total Motility (Semen)      40 %    Prog. Motility (Semen)      32 %    Non Prog. Motility (Semen)      %    Total No of Sperm (Semen)      39 mill    Total No of Motile (Semen)      mill    Total No of Rnd Cells (Semen)      5 mill    Leukocyte (Semen)    % Normal (Semen)      4 %    % Head defects (Semen)      %    % Neck Midpiece (Semen)      %    %  Tail defects (Semen)      %    % Ex Residual Cytoplasm (Semen)      %    Tot. No of Norm. Motile Sperm (Semen)      mill    Tot. No of Norm. Sperm (Semen)      mill    Abstinence (days)      2 - 7 days    Collected Completely    Collection Location    Collection Method    Received time    Analyzed Time    LEUKOCYTES (10*3/UL) IN BLOOD BY AUTOMATED COUNT, [place]      4.4 - 11.3 x10*3/uL 8.4    nRBC      0.0 - 0.0 /100 WBCs 0.0    ERYTHROCYTES (10*6/UL) IN BLOOD BY AUTOMATED COUNT, [place]      4.50 - 5.90 x10*6/uL 4.39 (L)    HEMOGLOBIN      13.5 - 17.5 g/dL 13.4 (L)    HEMATOCRIT      41.0 - 52.0 % 40.5 (L)    MCV      80 - 100 fL 92    MCH      26.0 - 34.0 pg 30.5    MCHC      32.0 - 36.0 g/dL 33.1    RED CELL DISTRIBUTION WIDTH      11.5 - 14.5 % 12.5    PLATELETS (10*3/UL) IN BLOOD AUTOMATED COUNT, [place]      150 - 450 x10*3/uL 230    NEUTROPHILS/100 LEUKOCYTES IN BLOOD BY AUTOMATED COUNT, [place]      40.0 - 80.0 % 62.8    Immature Granulocytes %, Automated      0.0 - 0.9 % 0.4    Lymphocytes %      13.0 - 44.0 % 25.2    Monocytes %      2.0 - 10.0 % 8.0    Eosinophils %      0.0 - 6.0 % 3.0    Basophils %      0.0 - 2.0 % 0.6    NEUTROPHILS (10*3/UL) IN BLOOD BY AUTOMATED COUNT, [place]      1.20 - 7.70 x10*3/uL 5.25    Immature Granulocytes Absolute, Automated      0.00 - 0.70 x10*3/uL 0.03    Lymphocytes Absolute      1.20 - 4.80 x10*3/uL 2.11    Monocytes Absolute      0.10 - 1.00 x10*3/uL 0.67    Eosinophils Absolute      0.00 - 0.70 x10*3/uL 0.25    Basophils Absolute      0.00 - 0.10 x10*3/uL 0.05    GLUCOSE      74 - 99 mg/dL 170 (H)    SODIUM      136 - 145 mmol/L 134 (L)    POTASSIUM      3.5 - 5.3 mmol/L 3.9    CHLORIDE      98 - 107 mmol/L 100    Bicarbonate      21 - 32 mmol/L 25    Anion Gap      10 - 20 mmol/L 13    Blood Urea Nitrogen      6 - 23 mg/dL 10    Creatinine      0.50 - 1.30 mg/dL 0.79    EGFR      >60 mL/min/1.73m*2 >90    Calcium      8.6 - 10.3 mg/dL 9.0    Albumin      3.4  "- 5.0 g/dL 4.2    Alkaline Phosphatase      33 - 120 U/L 62    Total Protein      6.4 - 8.2 g/dL 6.7    AST      9 - 39 U/L 21    Bilirubin Total      0.0 - 1.2 mg/dL 0.5    ALT      10 - 52 U/L 33    Testosterone, Free      35.0 - 155.0 pg/mL    Testosterone, Total, LC-MS/MS      250 - 1100 ng/dL    Hemoglobin A1C      see below % 8.3 (H)    Estimated Average Glucose      Not Established mg/dL 192         No results found for: \"TESTOSTERONE\"  No results found for: \"LH\"  Lab Results   Component Value Date    FSH 3.4 10/05/2023     No components found for: \"ESTRADIAL\"  No results found for: \"PROLACTIN\"    PREVIOUS RISK FACTORS  History of testicular exposure to chemicals, radiation, or toxins: None  History of high fever, epididymitis, orchitis, prostatitis, sexually transmitted diseases, or trauma to the testicles: None  History of a varicocele, testicular torsion, cryptorchidism, postpubertile mumps or a family history of infertility: None  Coital Frequency: every other day  Coital Lubricants: uses KY Jelly  Problems with erections or ejaculation: None  Smoker? Quit 3-4years ago  Previous Testosterone or anabolic steroid Use: none      PRIOR Assisted Reproductive Technology:  None      PMH:  Past Medical History:   Diagnosis Date    Diabetes mellitus (Multi)     Erectile dysfunction     Hypercholesteremia     Hypertension         PSH:  No past surgical history on file.     Medications:    Current Outpatient Medications:     cholecalciferol (Vitamin D3) 25 MCG (1000 UT) tablet, Take 1 tablet (25 mcg) by mouth once daily., Disp: , Rfl:     empagliflozin (Jardiance) 10 mg, Take 1 tablet (10 mg) by mouth once daily., Disp: 90 tablet, Rfl: 3    losartan (Cozaar) 50 mg tablet, Take 1 tablet (50 mg) by mouth once daily., Disp: 90 tablet, Rfl: 3    metFORMIN  mg 24 hr tablet, TAKE 2 TABLETS BY MOUTH IN THE MORNING AFTER MEALS AND 2 TABLETS EVERY EVENING AFTER MEALS, Disp: 360 tablet, Rfl: 3    rosuvastatin (Crestor) " 10 mg tablet, Take 1 tablet (10 mg) by mouth once daily., Disp: 90 tablet, Rfl: 3    tadalafil (Cialis) 5 mg tablet, Take 1 tablet (5 mg) by mouth once daily., Disp: 90 tablet, Rfl: 3    tirzepatide (Mounjaro) 7.5 mg/0.5 mL pen injector, Inject 7.5 mg under the skin 1 (one) time per week., Disp: 6 mL, Rfl: 3    Allergy:  Allergies   Allergen Reactions    Lactose GI Upset    Lisinopril Cough        Exam  Testicles descended bilaterally, nontender, no masses  Vasa palpable bilaterally  Penis circ'd, no lesions, no plaques      Assessment/Plan  #infertility, low volume ejaculate    Semen analysis with strict morphology with retrograde  Fertility Panel:   serum Estradiol, FSH + LH, serum Prolactin, Testosterone   Discussed stopping KY jelly as this usually has spermicide in it      #ED  -cialis 5mg daily - continue    Fu virtually after

## 2024-09-24 ENCOUNTER — LAB (OUTPATIENT)
Dept: LAB | Facility: LAB | Age: 39
End: 2024-09-24
Payer: COMMERCIAL

## 2024-09-24 DIAGNOSIS — E78.1 HYPERTRIGLYCERIDEMIA: ICD-10-CM

## 2024-09-24 DIAGNOSIS — E11.29 TYPE 2 DIABETES MELLITUS WITH MICROALBUMINURIA, WITHOUT LONG-TERM CURRENT USE OF INSULIN (MULTI): ICD-10-CM

## 2024-09-24 DIAGNOSIS — Z11.59 ENCOUNTER FOR HEPATITIS C SCREENING TEST FOR LOW RISK PATIENT: ICD-10-CM

## 2024-09-24 DIAGNOSIS — E29.1 HYPOGONADISM IN MALE: ICD-10-CM

## 2024-09-24 DIAGNOSIS — E66.09 CLASS 1 OBESITY DUE TO EXCESS CALORIES WITH SERIOUS COMORBIDITY AND BODY MASS INDEX (BMI) OF 33.0 TO 33.9 IN ADULT: ICD-10-CM

## 2024-09-24 DIAGNOSIS — R80.9 TYPE 2 DIABETES MELLITUS WITH MICROALBUMINURIA, WITHOUT LONG-TERM CURRENT USE OF INSULIN (MULTI): ICD-10-CM

## 2024-09-24 DIAGNOSIS — I10 PRIMARY HYPERTENSION: ICD-10-CM

## 2024-09-24 LAB
ALBUMIN SERPL BCP-MCNC: 4.8 G/DL (ref 3.4–5)
ALP SERPL-CCNC: 68 U/L (ref 33–120)
ALT SERPL W P-5'-P-CCNC: 37 U/L (ref 10–52)
ANION GAP SERPL CALC-SCNC: 13 MMOL/L (ref 10–20)
AST SERPL W P-5'-P-CCNC: 28 U/L (ref 9–39)
BASOPHILS # BLD AUTO: 0.06 X10*3/UL (ref 0–0.1)
BASOPHILS NFR BLD AUTO: 0.8 %
BILIRUB SERPL-MCNC: 0.6 MG/DL (ref 0–1.2)
BUN SERPL-MCNC: 12 MG/DL (ref 6–23)
CALCIUM SERPL-MCNC: 9.7 MG/DL (ref 8.6–10.3)
CHLORIDE SERPL-SCNC: 104 MMOL/L (ref 98–107)
CHOLEST SERPL-MCNC: 90 MG/DL (ref 0–199)
CHOLESTEROL/HDL RATIO: 1.9
CO2 SERPL-SCNC: 24 MMOL/L (ref 21–32)
CREAT SERPL-MCNC: 0.82 MG/DL (ref 0.5–1.3)
CREAT UR-MCNC: 113.1 MG/DL (ref 20–370)
EGFRCR SERPLBLD CKD-EPI 2021: >90 ML/MIN/1.73M*2
EOSINOPHIL # BLD AUTO: 0.29 X10*3/UL (ref 0–0.7)
EOSINOPHIL NFR BLD AUTO: 4 %
ERYTHROCYTE [DISTWIDTH] IN BLOOD BY AUTOMATED COUNT: 12.6 % (ref 11.5–14.5)
GLUCOSE SERPL-MCNC: 154 MG/DL (ref 74–99)
HCT VFR BLD AUTO: 46 % (ref 41–52)
HDLC SERPL-MCNC: 47 MG/DL
HGB BLD-MCNC: 15.2 G/DL (ref 13.5–17.5)
IMM GRANULOCYTES # BLD AUTO: 0.02 X10*3/UL (ref 0–0.7)
IMM GRANULOCYTES NFR BLD AUTO: 0.3 % (ref 0–0.9)
LDLC SERPL CALC-MCNC: 28 MG/DL
LYMPHOCYTES # BLD AUTO: 1.97 X10*3/UL (ref 1.2–4.8)
LYMPHOCYTES NFR BLD AUTO: 26.8 %
MAGNESIUM SERPL-MCNC: 2.02 MG/DL (ref 1.6–2.4)
MCH RBC QN AUTO: 30.7 PG (ref 26–34)
MCHC RBC AUTO-ENTMCNC: 33 G/DL (ref 32–36)
MCV RBC AUTO: 93 FL (ref 80–100)
MICROALBUMIN UR-MCNC: <7 MG/L
MICROALBUMIN/CREAT UR: NORMAL MG/G{CREAT}
MONOCYTES # BLD AUTO: 0.59 X10*3/UL (ref 0.1–1)
MONOCYTES NFR BLD AUTO: 8 %
NEUTROPHILS # BLD AUTO: 4.41 X10*3/UL (ref 1.2–7.7)
NEUTROPHILS NFR BLD AUTO: 60.1 %
NON HDL CHOLESTEROL: 43 MG/DL (ref 0–149)
NRBC BLD-RTO: 0 /100 WBCS (ref 0–0)
PLATELET # BLD AUTO: 223 X10*3/UL (ref 150–450)
POTASSIUM SERPL-SCNC: 4.1 MMOL/L (ref 3.5–5.3)
PROT SERPL-MCNC: 7.4 G/DL (ref 6.4–8.2)
RBC # BLD AUTO: 4.95 X10*6/UL (ref 4.5–5.9)
SODIUM SERPL-SCNC: 137 MMOL/L (ref 136–145)
TRIGL SERPL-MCNC: 77 MG/DL (ref 0–149)
TSH SERPL-ACNC: 1.31 MIU/L (ref 0.44–3.98)
VLDL: 15 MG/DL (ref 0–40)
WBC # BLD AUTO: 7.3 X10*3/UL (ref 4.4–11.3)

## 2024-09-24 PROCEDURE — 84403 ASSAY OF TOTAL TESTOSTERONE: CPT

## 2024-09-24 PROCEDURE — 84146 ASSAY OF PROLACTIN: CPT

## 2024-09-24 PROCEDURE — 83036 HEMOGLOBIN GLYCOSYLATED A1C: CPT

## 2024-09-24 PROCEDURE — 82570 ASSAY OF URINE CREATININE: CPT

## 2024-09-24 PROCEDURE — 85025 COMPLETE CBC W/AUTO DIFF WBC: CPT

## 2024-09-24 PROCEDURE — 83002 ASSAY OF GONADOTROPIN (LH): CPT

## 2024-09-24 PROCEDURE — 83735 ASSAY OF MAGNESIUM: CPT

## 2024-09-24 PROCEDURE — 80053 COMPREHEN METABOLIC PANEL: CPT

## 2024-09-24 PROCEDURE — 86803 HEPATITIS C AB TEST: CPT

## 2024-09-24 PROCEDURE — 84443 ASSAY THYROID STIM HORMONE: CPT

## 2024-09-24 PROCEDURE — 82670 ASSAY OF TOTAL ESTRADIOL: CPT

## 2024-09-24 PROCEDURE — 82043 UR ALBUMIN QUANTITATIVE: CPT

## 2024-09-24 PROCEDURE — 80061 LIPID PANEL: CPT

## 2024-09-24 PROCEDURE — 36415 COLL VENOUS BLD VENIPUNCTURE: CPT

## 2024-09-25 LAB
EST. AVERAGE GLUCOSE BLD GHB EST-MCNC: 140 MG/DL
ESTRADIOL SERPL-MCNC: 32 PG/ML
HBA1C MFR BLD: 6.5 %
HCV AB SER QL: NONREACTIVE
LH SERPL-ACNC: 4.9 IU/L
PROLACTIN SERPL-MCNC: 17.6 UG/L (ref 2–18)
TESTOST SERPL-MCNC: 331 NG/DL (ref 240–1000)

## 2024-09-27 ENCOUNTER — APPOINTMENT (OUTPATIENT)
Dept: PRIMARY CARE | Facility: CLINIC | Age: 39
End: 2024-09-27
Payer: COMMERCIAL

## 2024-09-27 VITALS
RESPIRATION RATE: 18 BRPM | DIASTOLIC BLOOD PRESSURE: 79 MMHG | OXYGEN SATURATION: 94 % | WEIGHT: 244 LBS | SYSTOLIC BLOOD PRESSURE: 117 MMHG | HEIGHT: 73 IN | HEART RATE: 103 BPM | BODY MASS INDEX: 32.34 KG/M2 | TEMPERATURE: 97.2 F

## 2024-09-27 DIAGNOSIS — I10 PRIMARY HYPERTENSION: ICD-10-CM

## 2024-09-27 DIAGNOSIS — Z23 ENCOUNTER FOR IMMUNIZATION: ICD-10-CM

## 2024-09-27 DIAGNOSIS — E11.29 TYPE 2 DIABETES MELLITUS WITH MICROALBUMINURIA, WITHOUT LONG-TERM CURRENT USE OF INSULIN (MULTI): Primary | ICD-10-CM

## 2024-09-27 DIAGNOSIS — R80.9 TYPE 2 DIABETES MELLITUS WITH MICROALBUMINURIA, WITHOUT LONG-TERM CURRENT USE OF INSULIN (MULTI): Primary | ICD-10-CM

## 2024-09-27 DIAGNOSIS — E66.09 CLASS 1 OBESITY DUE TO EXCESS CALORIES WITH SERIOUS COMORBIDITY AND BODY MASS INDEX (BMI) OF 32.0 TO 32.9 IN ADULT: ICD-10-CM

## 2024-09-27 DIAGNOSIS — E78.1 HYPERTRIGLYCERIDEMIA: ICD-10-CM

## 2024-09-27 PROCEDURE — 99214 OFFICE O/P EST MOD 30 MIN: CPT | Performed by: FAMILY MEDICINE

## 2024-09-27 PROCEDURE — 3048F LDL-C <100 MG/DL: CPT | Performed by: FAMILY MEDICINE

## 2024-09-27 PROCEDURE — 3062F POS MACROALBUMINURIA REV: CPT | Performed by: FAMILY MEDICINE

## 2024-09-27 PROCEDURE — 1036F TOBACCO NON-USER: CPT | Performed by: FAMILY MEDICINE

## 2024-09-27 PROCEDURE — 3074F SYST BP LT 130 MM HG: CPT | Performed by: FAMILY MEDICINE

## 2024-09-27 PROCEDURE — 3008F BODY MASS INDEX DOCD: CPT | Performed by: FAMILY MEDICINE

## 2024-09-27 PROCEDURE — 3044F HG A1C LEVEL LT 7.0%: CPT | Performed by: FAMILY MEDICINE

## 2024-09-27 PROCEDURE — 4010F ACE/ARB THERAPY RXD/TAKEN: CPT | Performed by: FAMILY MEDICINE

## 2024-09-27 PROCEDURE — 90656 IIV3 VACC NO PRSV 0.5 ML IM: CPT | Performed by: FAMILY MEDICINE

## 2024-09-27 PROCEDURE — 90471 IMMUNIZATION ADMIN: CPT | Performed by: FAMILY MEDICINE

## 2024-09-27 PROCEDURE — 3078F DIAST BP <80 MM HG: CPT | Performed by: FAMILY MEDICINE

## 2024-09-27 ASSESSMENT — ENCOUNTER SYMPTOMS
ABDOMINAL PAIN: 0
HALLUCINATIONS: 0
PALPITATIONS: 0
WEIGHT LOSS: 0
VOMITING: 0
BLURRED VISION: 0
ABDOMINAL DISTENTION: 0
CONSTIPATION: 0
COUGH: 0
SORE THROAT: 0
PHOTOPHOBIA: 0
FREQUENCY: 0
BACK PAIN: 0
EYE DISCHARGE: 0
DYSPHORIC MOOD: 0
FEVER: 0
POLYDIPSIA: 0
UNEXPECTED WEIGHT CHANGE: 0
DIARRHEA: 0
BLOOD IN STOOL: 0
FLANK PAIN: 0
POLYPHAGIA: 0
SLEEP DISTURBANCE: 0
VOICE CHANGE: 0
BRUISES/BLEEDS EASILY: 0
FACIAL ASYMMETRY: 0
AGITATION: 0
NECK PAIN: 0
LIGHT-HEADEDNESS: 0
EYE ITCHING: 0
EYE REDNESS: 0
CHILLS: 0
WHEEZING: 0
ARTHRALGIAS: 0
ADENOPATHY: 0
WOUND: 0
EYE PAIN: 0
JOINT SWELLING: 0
FATIGUE: 0
WEAKNESS: 0
DYSURIA: 0
VISUAL CHANGE: 0
HEMATURIA: 0
NAUSEA: 0
DIAPHORESIS: 0
APPETITE CHANGE: 0
TROUBLE SWALLOWING: 0
HYPERTENSION: 1
NUMBNESS: 0
CHEST TIGHTNESS: 0
CHOKING: 0
NECK STIFFNESS: 0
ACTIVITY CHANGE: 0

## 2024-09-27 NOTE — PATIENT INSTRUCTIONS
BMI was above normal measurement. Current weight: 111 kg (244 lb)  Weight change since last visit (-) denotes wt loss -6 lbs   Weight loss needed to achieve BMI 25: 54.9 Lbs  Weight loss needed to achieve BMI 30: 17.1 Lbs  Provided instructions on dietary changes  Provided instructions on exercise  Advised to Increase physical activity.

## 2024-09-27 NOTE — PROGRESS NOTES
Subjective   Patient ID: Markus Perez is a 39 y.o. male who presents for 3 month check up (And review labs ).    Diabetes  He presents for his follow-up diabetic visit. He has type 2 diabetes mellitus. His disease course has been improving. There are no hypoglycemic associated symptoms. Pertinent negatives for diabetes include no blurred vision, no fatigue, no foot paresthesias, no foot ulcerations, no polydipsia, no polyphagia, no polyuria, no visual change, no weakness and no weight loss. There are no hypoglycemic complications. Diabetic complications include impotence. Risk factors for coronary artery disease include diabetes mellitus, dyslipidemia, family history, obesity, male sex and hypertension. Current diabetic treatment includes diet and oral agent (dual therapy). He is compliant with treatment all of the time. His weight is decreasing steadily. He is following a generally healthy diet. When asked about meal planning, he reported none. He has not had a previous visit with a dietitian. He participates in exercise intermittently. An ACE inhibitor/angiotensin II receptor blocker is being taken. He does not see a podiatrist.Eye exam is current.   Hyperlipidemia  This is a chronic problem. The current episode started more than 1 year ago. The problem is controlled. Recent lipid tests were reviewed and are low. Exacerbating diseases include diabetes and obesity. There are no known factors aggravating his hyperlipidemia. Current antihyperlipidemic treatment includes statins. The current treatment provides significant improvement of lipids. There are no compliance problems.    Hypertension  This is a chronic problem. The problem is unchanged. The problem is controlled. Pertinent negatives include no blurred vision, neck pain or palpitations. There are no associated agents to hypertension. Past treatments include ACE inhibitors. The current treatment provides significant improvement. Compliance problems include  "medication side effects.         Review of Systems   Constitutional:  Negative for activity change, appetite change, chills, diaphoresis, fatigue, fever, unexpected weight change and weight loss.   HENT:  Negative for hearing loss, nosebleeds, postnasal drip, sore throat, trouble swallowing and voice change.    Eyes:  Negative for blurred vision, photophobia, pain, discharge, redness, itching and visual disturbance.   Respiratory:  Negative for cough, choking, chest tightness and wheezing.    Cardiovascular:  Negative for palpitations and leg swelling.   Gastrointestinal:  Negative for abdominal distention, abdominal pain, blood in stool, constipation, diarrhea, nausea and vomiting.   Endocrine: Negative for cold intolerance, heat intolerance, polydipsia, polyphagia and polyuria.   Genitourinary:  Positive for impotence. Negative for dysuria, flank pain, frequency, hematuria and urgency.   Musculoskeletal:  Negative for arthralgias, back pain, joint swelling, neck pain and neck stiffness.   Skin:  Negative for rash and wound.   Allergic/Immunologic: Negative for immunocompromised state.   Neurological:  Negative for syncope, facial asymmetry, weakness, light-headedness and numbness.   Hematological:  Negative for adenopathy. Does not bruise/bleed easily.   Psychiatric/Behavioral:  Negative for agitation, behavioral problems, dysphoric mood, hallucinations, self-injury, sleep disturbance and suicidal ideas.        Objective   /79 (BP Location: Left arm, Patient Position: Sitting, BP Cuff Size: Large adult)   Pulse 103   Temp 36.2 °C (97.2 °F) (Temporal)   Resp 18   Ht 1.854 m (6' 1\")   Wt 111 kg (244 lb)   SpO2 94%   BMI 32.19 kg/m²     Physical Exam  Constitutional:       General: He is not in acute distress.     Appearance: He is not ill-appearing or diaphoretic.   HENT:      Head: Normocephalic and atraumatic.      Right Ear: External ear normal.      Left Ear: External ear normal.      Nose: Nose " normal. No rhinorrhea.   Eyes:      General: Lids are normal. No scleral icterus.        Right eye: No discharge.         Left eye: No discharge.      Conjunctiva/sclera: Conjunctivae normal.   Cardiovascular:      Rate and Rhythm: Normal rate and regular rhythm.      Pulses: Normal pulses.      Heart sounds: No murmur heard.  Pulmonary:      Effort: Pulmonary effort is normal. No respiratory distress.      Breath sounds: No decreased breath sounds, wheezing, rhonchi or rales.   Abdominal:      General: Bowel sounds are normal. There is no distension.      Palpations: Abdomen is soft. There is no mass.      Tenderness: There is no abdominal tenderness. There is no guarding or rebound.   Musculoskeletal:         General: No swelling, tenderness or deformity.      Cervical back: No rigidity or tenderness.      Right lower leg: No edema.      Left lower leg: No edema.   Lymphadenopathy:      Cervical: No cervical adenopathy.      Upper Body:      Right upper body: No supraclavicular adenopathy.      Left upper body: No supraclavicular adenopathy.   Skin:     General: Skin is warm and dry.      Coloration: Skin is not jaundiced or pale.      Findings: No erythema, lesion or rash.   Neurological:      General: No focal deficit present.      Mental Status: He is alert and oriented to person, place, and time.      Sensory: No sensory deficit.      Motor: No weakness or tremor.      Coordination: Coordination normal.      Gait: Gait normal.   Psychiatric:         Mood and Affect: Mood normal. Affect is not inappropriate.         Behavior: Behavior normal.         Assessment/Plan   Diagnoses and all orders for this visit:  Type 2 diabetes mellitus with microalbuminuria, without long-term current use of insulin (Multi)  -     Follow Up In [facility] - PCP - Established  -     Albumin-Creatinine Ratio, Urine Random; Future  -     CBC and Auto Differential; Future  -     Comprehensive Metabolic Panel; Future  -      Hemoglobin A1C; Future  -     Lipid Panel; Future  -     Magnesium; Future  -     TSH with reflex to Free T4 if abnormal; Future  -     Follow Up In [facility] - PCP  Health Maintenance; Future  Primary hypertension  -     Follow Up In [facility] - PCP - Established  -     Albumin-Creatinine Ratio, Urine Random; Future  -     CBC and Auto Differential; Future  -     Comprehensive Metabolic Panel; Future  -     Lipid Panel; Future  -     Magnesium; Future  -     TSH with reflex to Free T4 if abnormal; Future  -     Follow Up In [facility] Health Maintenance; Future  Hypertriglyceridemia  -     Follow Up In [facility] - PCP  Established  -     Comprehensive Metabolic Panel; Future  -     Lipid Panel; Future  -     TSH with reflex to Free T4 if abnormal; Future  -     Follow Up In [facility]; Future  Encounter for immunization  -     Flu vaccine, trivalent, preservative free, age 6 months and greater (Fluarix/Fluzone/Flulaval)  Class 1 obesity due to excess calories with serious comorbidity and body mass index (BMI) of 32.0 to 32.9 in adult

## 2024-10-01 ENCOUNTER — APPOINTMENT (OUTPATIENT)
Dept: UROLOGY | Facility: CLINIC | Age: 39
End: 2024-10-01
Payer: COMMERCIAL

## 2024-10-02 ENCOUNTER — ANCILLARY PROCEDURE (OUTPATIENT)
Dept: ENDOCRINOLOGY | Facility: CLINIC | Age: 39
End: 2024-10-02
Payer: COMMERCIAL

## 2024-10-02 DIAGNOSIS — N46.9 INFERTILITY MALE: ICD-10-CM

## 2024-10-02 LAB
% EX RESIDUAL CYTOPLASM (SEMEN): 0 %
% HEAD DEFECTS (SEMEN): 95.5 %
% NECK MIDPIECE (SEMEN): 15.3 %
% NORMAL (SEMEN): 4.5 % (ref 4–?)
% TAIL DEFECTS (SEMEN): 10.5 %
ABSTINENCE (DAYS): 3 DAYS (ref 2–7)
AGGLUTINATION (SEMEN): NO
ANALYZED TIME:: ABNORMAL
ANDROLOGY LAB ID#: ABNORMAL
CLUMPS (SEMEN): YES
COLLECTED COMPLETELY: YES
COLLECTION LOCATION:: ABNORMAL
COLLECTION METHOD:: ABNORMAL
CONCENTRATION (URINE): 0.19
CONCENTRATION(SEMEN): 339.17 MILL/ML (ref 15–?)
DEBRIS (SEMEN): YES
NON PROG. MOTILITY (SEMEN): 6 %
NON PROG. MOTILITY (URINE): 23 %
PROG. MOTILITY (SEMEN): 37 % (ref 32–?)
PROG. MOTILITY (URINE): 0 %
RECEIVED TIME:: ABNORMAL
REI PARTNER DOB: ABNORMAL
REI PARTNER NAME: ABNORMAL
SEMEN APPEARANCE: NORMAL
SEMEN LIQUEFACTION: NORMAL
SEMEN VISCOSITY: ABNORMAL
TOT. NO OF NORM. MOTILE SPERM (SEMEN): 1.94 MILL
TOT. NO OF NORM. SPERM (SEMEN): 4.58 MILL
TOTAL MOTILITY (SEMEN): 42 % (ref 40–?)
TOTAL MOTILITY (URINE): 23 %
TOTAL NO OF MOTILE (SEMEN): 43.2 MILL
TOTAL NO OF MOTILE (URINE): 0.43 MILL
TOTAL NO OF RND CELLS (URINE): ABNORMAL MILL
TOTAL NO OF SPERM (SEMEN): 101.75 MILL (ref 39–?)
TOTAL NO OF SPERM (URINE): 1.84 MILL
VOLUME (SEMEN): 0.3 ML (ref 1.5–?)
VOLUME OF URINE: 10 ML

## 2024-10-02 PROCEDURE — 89331 RETROGRADE EJACULATION ANAL: CPT | Performed by: OBSTETRICS & GYNECOLOGY

## 2024-10-02 PROCEDURE — 89322 SEMEN ANAL STRICT CRITERIA: CPT | Performed by: OBSTETRICS & GYNECOLOGY

## 2024-10-16 ENCOUNTER — APPOINTMENT (OUTPATIENT)
Dept: UROLOGY | Facility: CLINIC | Age: 39
End: 2024-10-16
Payer: COMMERCIAL

## 2024-10-16 DIAGNOSIS — Z31.69 INFERTILITY COUNSELING: Primary | ICD-10-CM

## 2024-10-16 DIAGNOSIS — F52.32 INHIBITED MALE ORGASM: ICD-10-CM

## 2024-10-16 PROCEDURE — 3044F HG A1C LEVEL LT 7.0%: CPT | Performed by: UROLOGY

## 2024-10-16 PROCEDURE — 3048F LDL-C <100 MG/DL: CPT | Performed by: UROLOGY

## 2024-10-16 PROCEDURE — 3061F NEG MICROALBUMINURIA REV: CPT | Performed by: UROLOGY

## 2024-10-16 PROCEDURE — 4010F ACE/ARB THERAPY RXD/TAKEN: CPT | Performed by: UROLOGY

## 2024-10-16 PROCEDURE — 99214 OFFICE O/P EST MOD 30 MIN: CPT | Performed by: UROLOGY

## 2024-11-12 DIAGNOSIS — E11.9 TYPE 2 DIABETES MELLITUS WITHOUT COMPLICATION, WITHOUT LONG-TERM CURRENT USE OF INSULIN (MULTI): ICD-10-CM

## 2024-11-12 RX ORDER — TIRZEPATIDE 7.5 MG/.5ML
INJECTION, SOLUTION SUBCUTANEOUS
Qty: 2 ML | Refills: 0 | Status: SHIPPED | OUTPATIENT
Start: 2024-11-12

## 2025-02-05 DIAGNOSIS — R80.9 TYPE 2 DIABETES MELLITUS WITH MICROALBUMINURIA, WITHOUT LONG-TERM CURRENT USE OF INSULIN (MULTI): ICD-10-CM

## 2025-02-05 DIAGNOSIS — E11.29 TYPE 2 DIABETES MELLITUS WITH MICROALBUMINURIA, WITHOUT LONG-TERM CURRENT USE OF INSULIN (MULTI): ICD-10-CM

## 2025-02-21 DIAGNOSIS — N52.03 COMBINED ARTERIAL INSUFFICIENCY AND CORPORO-VENOUS OCCLUSIVE ERECTILE DYSFUNCTION: ICD-10-CM

## 2025-02-21 DIAGNOSIS — E78.1 HYPERTRIGLYCERIDEMIA: ICD-10-CM

## 2025-02-21 DIAGNOSIS — E11.9 TYPE 2 DIABETES MELLITUS WITHOUT COMPLICATION, WITHOUT LONG-TERM CURRENT USE OF INSULIN (MULTI): ICD-10-CM

## 2025-02-21 DIAGNOSIS — I10 PRIMARY HYPERTENSION: ICD-10-CM

## 2025-02-21 DIAGNOSIS — R80.9 TYPE 2 DIABETES MELLITUS WITH MICROALBUMINURIA, WITHOUT LONG-TERM CURRENT USE OF INSULIN (MULTI): ICD-10-CM

## 2025-02-21 DIAGNOSIS — E11.29 TYPE 2 DIABETES MELLITUS WITH MICROALBUMINURIA, WITHOUT LONG-TERM CURRENT USE OF INSULIN (MULTI): ICD-10-CM

## 2025-02-24 RX ORDER — TADALAFIL 5 MG/1
5 TABLET ORAL DAILY PRN
Qty: 30 TABLET | Refills: 0 | Status: SHIPPED | OUTPATIENT
Start: 2025-02-24

## 2025-02-24 RX ORDER — ROSUVASTATIN CALCIUM 10 MG/1
10 TABLET, COATED ORAL DAILY
Qty: 30 TABLET | Refills: 0 | Status: SHIPPED | OUTPATIENT
Start: 2025-02-24

## 2025-02-24 RX ORDER — METFORMIN HYDROCHLORIDE 500 MG/1
TABLET, EXTENDED RELEASE ORAL
Qty: 30 TABLET | Refills: 0 | Status: SHIPPED | OUTPATIENT
Start: 2025-02-24

## 2025-02-24 RX ORDER — LOSARTAN POTASSIUM 50 MG/1
50 TABLET ORAL DAILY
Qty: 30 TABLET | Refills: 0 | Status: SHIPPED | OUTPATIENT
Start: 2025-02-24

## 2025-02-24 RX ORDER — TIRZEPATIDE 7.5 MG/.5ML
7.5 INJECTION, SOLUTION SUBCUTANEOUS WEEKLY
Qty: 0.5 ML | Refills: 0 | Status: SHIPPED | OUTPATIENT
Start: 2025-02-24

## 2025-03-25 LAB
ALBUMIN SERPL-MCNC: 4.6 G/DL (ref 3.6–5.1)
ALP SERPL-CCNC: 61 U/L (ref 36–130)
ALT SERPL-CCNC: 35 U/L (ref 9–46)
ANION GAP SERPL CALCULATED.4IONS-SCNC: 10 MMOL/L (CALC) (ref 7–17)
AST SERPL-CCNC: 28 U/L (ref 10–40)
BILIRUB SERPL-MCNC: 0.6 MG/DL (ref 0.2–1.2)
BUN SERPL-MCNC: 14 MG/DL (ref 7–25)
CALCIUM SERPL-MCNC: 9.7 MG/DL (ref 8.6–10.3)
CHLORIDE SERPL-SCNC: 100 MMOL/L (ref 98–110)
CHOLEST SERPL-MCNC: 89 MG/DL
CHOLEST/HDLC SERPL: 1.8 (CALC)
CO2 SERPL-SCNC: 28 MMOL/L (ref 20–32)
CREAT SERPL-MCNC: 0.9 MG/DL (ref 0.6–1.26)
EGFRCR SERPLBLD CKD-EPI 2021: 112 ML/MIN/1.73M2
GLUCOSE SERPL-MCNC: 126 MG/DL (ref 65–99)
HDLC SERPL-MCNC: 49 MG/DL
LDLC SERPL CALC-MCNC: 21 MG/DL (CALC)
MAGNESIUM SERPL-MCNC: 2.1 MG/DL (ref 1.5–2.5)
NONHDLC SERPL-MCNC: 40 MG/DL (CALC)
POTASSIUM SERPL-SCNC: 3.9 MMOL/L (ref 3.5–5.3)
PROT SERPL-MCNC: 7.2 G/DL (ref 6.1–8.1)
SODIUM SERPL-SCNC: 138 MMOL/L (ref 135–146)
TRIGL SERPL-MCNC: 100 MG/DL

## 2025-03-26 LAB
BASOPHILS # BLD AUTO: 43 CELLS/UL (ref 0–200)
BASOPHILS NFR BLD AUTO: 0.5 %
EOSINOPHIL # BLD AUTO: 170 CELLS/UL (ref 15–500)
EOSINOPHIL NFR BLD AUTO: 2 %
ERYTHROCYTE [DISTWIDTH] IN BLOOD BY AUTOMATED COUNT: 12.3 % (ref 11–15)
EST. AVERAGE GLUCOSE BLD GHB EST-MCNC: 137 MG/DL
EST. AVERAGE GLUCOSE BLD GHB EST-SCNC: 7.6 MMOL/L
HBA1C MFR BLD: 6.4 % OF TOTAL HGB
HCT VFR BLD AUTO: 46.2 % (ref 38.5–50)
HGB BLD-MCNC: 15.8 G/DL (ref 13.2–17.1)
LYMPHOCYTES # BLD AUTO: 2100 CELLS/UL (ref 850–3900)
LYMPHOCYTES NFR BLD AUTO: 24.7 %
MCH RBC QN AUTO: 31.4 PG (ref 27–33)
MCHC RBC AUTO-ENTMCNC: 34.2 G/DL (ref 32–36)
MCV RBC AUTO: 91.8 FL (ref 80–100)
MONOCYTES # BLD AUTO: 629 CELLS/UL (ref 200–950)
MONOCYTES NFR BLD AUTO: 7.4 %
NEUTROPHILS # BLD AUTO: 5559 CELLS/UL (ref 1500–7800)
NEUTROPHILS NFR BLD AUTO: 65.4 %
PLATELET # BLD AUTO: 251 THOUSAND/UL (ref 140–400)
PMV BLD REES-ECKER: 11.1 FL (ref 7.5–12.5)
RBC # BLD AUTO: 5.03 MILLION/UL (ref 4.2–5.8)
TSH SERPL-ACNC: 2.52 MIU/L (ref 0.4–4.5)
WBC # BLD AUTO: 8.5 THOUSAND/UL (ref 3.8–10.8)

## 2025-03-28 ENCOUNTER — APPOINTMENT (OUTPATIENT)
Dept: PRIMARY CARE | Facility: CLINIC | Age: 40
End: 2025-03-28
Payer: COMMERCIAL

## 2025-03-28 VITALS
RESPIRATION RATE: 16 BRPM | HEART RATE: 106 BPM | BODY MASS INDEX: 31.73 KG/M2 | WEIGHT: 239.44 LBS | HEIGHT: 73 IN | OXYGEN SATURATION: 95 % | DIASTOLIC BLOOD PRESSURE: 81 MMHG | SYSTOLIC BLOOD PRESSURE: 115 MMHG | TEMPERATURE: 96.3 F

## 2025-03-28 DIAGNOSIS — I10 PRIMARY HYPERTENSION: ICD-10-CM

## 2025-03-28 DIAGNOSIS — Z00.00 ROUTINE GENERAL MEDICAL EXAMINATION AT A HEALTH CARE FACILITY: Primary | ICD-10-CM

## 2025-03-28 DIAGNOSIS — R80.9 TYPE 2 DIABETES MELLITUS WITH MICROALBUMINURIA, WITHOUT LONG-TERM CURRENT USE OF INSULIN (MULTI): ICD-10-CM

## 2025-03-28 DIAGNOSIS — E78.1 HYPERTRIGLYCERIDEMIA: ICD-10-CM

## 2025-03-28 DIAGNOSIS — E11.29 TYPE 2 DIABETES MELLITUS WITH MICROALBUMINURIA, WITHOUT LONG-TERM CURRENT USE OF INSULIN (MULTI): ICD-10-CM

## 2025-03-28 PROCEDURE — 1036F TOBACCO NON-USER: CPT | Performed by: FAMILY MEDICINE

## 2025-03-28 PROCEDURE — 4010F ACE/ARB THERAPY RXD/TAKEN: CPT | Performed by: FAMILY MEDICINE

## 2025-03-28 PROCEDURE — 99395 PREV VISIT EST AGE 18-39: CPT | Performed by: FAMILY MEDICINE

## 2025-03-28 PROCEDURE — 3079F DIAST BP 80-89 MM HG: CPT | Performed by: FAMILY MEDICINE

## 2025-03-28 PROCEDURE — 3008F BODY MASS INDEX DOCD: CPT | Performed by: FAMILY MEDICINE

## 2025-03-28 PROCEDURE — 3074F SYST BP LT 130 MM HG: CPT | Performed by: FAMILY MEDICINE

## 2025-03-28 RX ORDER — TIRZEPATIDE 10 MG/.5ML
10 INJECTION, SOLUTION SUBCUTANEOUS WEEKLY
Qty: 6 ML | Refills: 3 | Status: SHIPPED | OUTPATIENT
Start: 2025-03-28 | End: 2026-03-28

## 2025-03-28 ASSESSMENT — ENCOUNTER SYMPTOMS
WOUND: 0
HEMATURIA: 0
NUMBNESS: 0
FATIGUE: 0
CHEST TIGHTNESS: 0
SORE THROAT: 0
BLOOD IN STOOL: 0
FREQUENCY: 0
TROUBLE SWALLOWING: 0
PHOTOPHOBIA: 0
BLURRED VISION: 0
ABDOMINAL PAIN: 0
AGITATION: 0
NAUSEA: 0
FEVER: 0
HALLUCINATIONS: 0
HYPERTENSION: 1
ADENOPATHY: 0
BRUISES/BLEEDS EASILY: 0
NECK PAIN: 0
COUGH: 0
ACTIVITY CHANGE: 0
SLEEP DISTURBANCE: 0
UNEXPECTED WEIGHT CHANGE: 0
FLANK PAIN: 0
APPETITE CHANGE: 0
CHOKING: 0
BACK PAIN: 0
DIARRHEA: 0
CONSTIPATION: 0
VOICE CHANGE: 0
DYSURIA: 0
LIGHT-HEADEDNESS: 0
POLYPHAGIA: 0
NECK STIFFNESS: 0
EYE ITCHING: 0
PALPITATIONS: 0
WEIGHT LOSS: 0
EYE REDNESS: 0
EYE PAIN: 0
VISUAL CHANGE: 0
DYSPHORIC MOOD: 0
VOMITING: 0
CHILLS: 0
DIAPHORESIS: 0
FACIAL ASYMMETRY: 0
EYE DISCHARGE: 0
WHEEZING: 0
JOINT SWELLING: 0
POLYDIPSIA: 0
ARTHRALGIAS: 0
WEAKNESS: 0
ABDOMINAL DISTENTION: 0

## 2025-03-28 ASSESSMENT — PATIENT HEALTH QUESTIONNAIRE - PHQ9
1. LITTLE INTEREST OR PLEASURE IN DOING THINGS: NOT AT ALL
2. FEELING DOWN, DEPRESSED OR HOPELESS: NOT AT ALL
SUM OF ALL RESPONSES TO PHQ9 QUESTIONS 1 AND 2: 0

## 2025-03-28 NOTE — PROGRESS NOTES
Subjective   Patient ID: Markus Perez is a 39 y.o. male who presents for Follow-up (6 month follow up labs).    Subjective  Markus Perez is a 39 y.o. male and is here for a comprehensive physical exam. The patient reports stable-see below.    Do you take any herbs or supplements that were not prescribed by a doctor? no  Are you taking calcium supplements? no  Are you taking aspirin daily? no      History:  Any STD's in the past? none             Diabetes  He presents for his follow-up diabetic visit. He has type 2 diabetes mellitus. His disease course has been improving. There are no hypoglycemic associated symptoms. Pertinent negatives for diabetes include no blurred vision, no fatigue, no foot paresthesias, no foot ulcerations, no polydipsia, no polyphagia, no polyuria, no visual change, no weakness and no weight loss. There are no hypoglycemic complications. Diabetic complications include impotence. Risk factors for coronary artery disease include diabetes mellitus, dyslipidemia, family history, obesity, male sex and hypertension. Current diabetic treatment includes diet and oral agent (dual therapy). He is compliant with treatment all of the time. His weight is decreasing steadily. He is following a generally healthy diet. When asked about meal planning, he reported none. He has not had a previous visit with a dietitian. He participates in exercise intermittently. An ACE inhibitor/angiotensin II receptor blocker is being taken. He does not see a podiatrist.Eye exam is current.   Hyperlipidemia  This is a chronic problem. The current episode started more than 1 year ago. The problem is controlled. Recent lipid tests were reviewed and are low. Exacerbating diseases include diabetes and obesity. There are no known factors aggravating his hyperlipidemia. Current antihyperlipidemic treatment includes statins. The current treatment provides significant improvement of lipids. There are no compliance problems.   "  Hypertension  This is a chronic problem. The problem is unchanged. The problem is controlled. Pertinent negatives include no blurred vision, neck pain or palpitations. There are no associated agents to hypertension. Past treatments include ACE inhibitors. The current treatment provides significant improvement. Compliance problems include medication side effects.         Review of Systems   Constitutional:  Negative for activity change, appetite change, chills, diaphoresis, fatigue, fever, unexpected weight change and weight loss.   HENT:  Negative for hearing loss, nosebleeds, postnasal drip, sore throat, trouble swallowing and voice change.    Eyes:  Negative for blurred vision, photophobia, pain, discharge, redness, itching and visual disturbance.   Respiratory:  Negative for cough, choking, chest tightness and wheezing.    Cardiovascular:  Negative for palpitations and leg swelling.   Gastrointestinal:  Negative for abdominal distention, abdominal pain, blood in stool, constipation, diarrhea, nausea and vomiting.   Endocrine: Negative for cold intolerance, heat intolerance, polydipsia, polyphagia and polyuria.   Genitourinary:  Positive for impotence. Negative for dysuria, flank pain, frequency, hematuria and urgency.   Musculoskeletal:  Negative for arthralgias, back pain, joint swelling, neck pain and neck stiffness.   Skin:  Negative for rash and wound.   Allergic/Immunologic: Negative for immunocompromised state.   Neurological:  Negative for syncope, facial asymmetry, weakness, light-headedness and numbness.   Hematological:  Negative for adenopathy. Does not bruise/bleed easily.   Psychiatric/Behavioral:  Negative for agitation, behavioral problems, dysphoric mood, hallucinations, self-injury, sleep disturbance and suicidal ideas.        Objective   /81   Pulse 106   Temp 35.7 °C (96.3 °F) (Temporal)   Resp 16   Ht 1.854 m (6' 1\")   Wt 109 kg (239 lb 7 oz)   SpO2 95%   BMI 31.59 kg/m² "     Physical Exam  Constitutional:       General: He is not in acute distress.     Appearance: He is not ill-appearing or diaphoretic.   HENT:      Head: Normocephalic and atraumatic.      Right Ear: External ear normal.      Left Ear: External ear normal.      Nose: Nose normal. No rhinorrhea.   Eyes:      General: Lids are normal. No scleral icterus.        Right eye: No discharge.         Left eye: No discharge.      Conjunctiva/sclera: Conjunctivae normal.   Cardiovascular:      Rate and Rhythm: Normal rate and regular rhythm.      Pulses: Normal pulses.      Heart sounds: No murmur heard.  Pulmonary:      Effort: Pulmonary effort is normal. No respiratory distress.      Breath sounds: No decreased breath sounds, wheezing, rhonchi or rales.   Abdominal:      General: Bowel sounds are normal. There is no distension.      Palpations: Abdomen is soft. There is no mass.      Tenderness: There is no abdominal tenderness. There is no guarding or rebound.   Musculoskeletal:         General: No swelling, tenderness or deformity.      Cervical back: No rigidity or tenderness.      Right lower leg: No edema.      Left lower leg: No edema.   Lymphadenopathy:      Cervical: No cervical adenopathy.      Upper Body:      Right upper body: No supraclavicular adenopathy.      Left upper body: No supraclavicular adenopathy.   Skin:     General: Skin is warm and dry.      Coloration: Skin is not jaundiced or pale.      Findings: No erythema, lesion or rash.   Neurological:      General: No focal deficit present.      Mental Status: He is alert and oriented to person, place, and time.      Sensory: No sensory deficit.      Motor: No weakness or tremor.      Coordination: Coordination normal.      Gait: Gait normal.   Psychiatric:         Mood and Affect: Mood normal. Affect is not inappropriate.         Behavior: Behavior normal.         Assessment/Plan   Diagnoses and all orders for this visit:  Routine general medical  examination at a health care facility  Type 2 diabetes mellitus with microalbuminuria, without long-term current use of insulin (Multi)  -     Follow Up In [facility] - PCP - Health Maintenance  -     tirzepatide (Mounjaro) 10 mg/0.5 mL pen injector; Inject 10 mg under the skin 1 (one) time per week.  -     Albumin-Creatinine Ratio, Urine Random; Future  -     CBC and Auto Differential; Future  -     Comprehensive Metabolic Panel; Future  -     Hemoglobin A1C; Future  -     Lipid Panel; Future  -     Magnesium; Future  -     TSH with reflex to Free T4 if abnormal; Future  -     Vitamin D 25-Hydroxy,Total (for eval of Vitamin D levels); Future  -     Follow Up In [facility] - PCP - Established; Future  Primary hypertension  -     Follow Up In [facility] - PCP - Health Maintenance  -     Albumin-Creatinine Ratio, Urine Random; Future  -     CBC and Auto Differential; Future  -     Comprehensive Metabolic Panel; Future  -     Lipid Panel; Future  -     Magnesium; Future  -     TSH with reflex to Free T4 if abnormal; Future  -     Follow Up In [facility] - PCP - Established; Future  Hypertriglyceridemia  -     Follow Up In [facility] - [facility]  -     Comprehensive Metabolic Panel; Future  -     Lipid Panel; Future  -     TSH with reflex to Free T4 if abnormal; Future  -     Follow Up In [facility] - PCP - Established; Future      2. Patient Counseling:  --Nutrition: Stressed importance of moderation in sodium/caffeine intake, saturated fat and cholesterol, caloric balance, sufficient intake of fresh fruits, vegetables, fiber, calcium, iron.  --Exercise: Stressed the importance of regular exercise.   --Substance Abuse: Discussed cessation/primary prevention of tobacco, alcohol, or other drug use; driving or other dangerous activities under the influence; availability of treatment for abuse.   --Injury prevention: Discussed safety belts,  safety helmets, smoke detector, smoking near bedding or upholstery.   --Dental health: Discussed importance of regular tooth brushing, flossing, and dental visits.  --Immunizations reviewed.  --Discussed benefits of screening colonoscopy.  Due age 46  3. Discussed the patient's BMI with him.  The BMI is above average. The patient received Current weight: 109 kg (239 lb 7 oz)  Weight change since last visit (-) denotes wt loss -4.56 lbs   Weight loss needed to achieve BMI 25: 50.34 Lbs  Weight loss needed to achieve BMI 30: 12.54 Lbs    Provided instructions on dietary changes  Provided instructions on exercise  Advised to Increase physical activity because they have an above normal BMI.  4. Follow up 6 months with labs

## 2025-06-25 DIAGNOSIS — N52.03 COMBINED ARTERIAL INSUFFICIENCY AND CORPORO-VENOUS OCCLUSIVE ERECTILE DYSFUNCTION: ICD-10-CM

## 2025-06-26 RX ORDER — TADALAFIL 5 MG/1
5 TABLET ORAL DAILY
Qty: 90 TABLET | Refills: 0 | Status: SHIPPED | OUTPATIENT
Start: 2025-06-26

## 2025-07-24 DIAGNOSIS — E11.9 TYPE 2 DIABETES MELLITUS WITHOUT COMPLICATION, WITHOUT LONG-TERM CURRENT USE OF INSULIN: ICD-10-CM

## 2025-07-24 DIAGNOSIS — E78.1 HYPERTRIGLYCERIDEMIA: ICD-10-CM

## 2025-07-24 DIAGNOSIS — I10 PRIMARY HYPERTENSION: ICD-10-CM

## 2025-07-24 RX ORDER — ROSUVASTATIN CALCIUM 10 MG/1
10 TABLET, COATED ORAL DAILY
Qty: 90 TABLET | Refills: 0 | Status: SHIPPED | OUTPATIENT
Start: 2025-07-24

## 2025-07-24 RX ORDER — METFORMIN HYDROCHLORIDE 500 MG/1
TABLET, EXTENDED RELEASE ORAL
Qty: 360 TABLET | Refills: 0 | Status: SHIPPED | OUTPATIENT
Start: 2025-07-24

## 2025-07-24 RX ORDER — LOSARTAN POTASSIUM 50 MG/1
50 TABLET ORAL DAILY
Qty: 90 TABLET | Refills: 0 | Status: SHIPPED | OUTPATIENT
Start: 2025-07-24

## 2025-09-30 ENCOUNTER — APPOINTMENT (OUTPATIENT)
Dept: PRIMARY CARE | Facility: CLINIC | Age: 40
End: 2025-09-30
Payer: COMMERCIAL